# Patient Record
Sex: FEMALE | Race: WHITE | NOT HISPANIC OR LATINO | Employment: UNEMPLOYED | URBAN - METROPOLITAN AREA
[De-identification: names, ages, dates, MRNs, and addresses within clinical notes are randomized per-mention and may not be internally consistent; named-entity substitution may affect disease eponyms.]

---
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
## 2020-11-27 ENCOUNTER — HOSPITAL ENCOUNTER (EMERGENCY)
Facility: CLINIC | Age: 20
Discharge: HOME | End: 2020-11-27
Attending: EMERGENCY MEDICINE | Admitting: EMERGENCY MEDICINE
Payer: COMMERCIAL

## 2020-11-27 VITALS
HEART RATE: 89 BPM | SYSTOLIC BLOOD PRESSURE: 124 MMHG | RESPIRATION RATE: 16 BRPM | TEMPERATURE: 99.2 F | DIASTOLIC BLOOD PRESSURE: 71 MMHG | WEIGHT: 230 LBS | HEIGHT: 66 IN | BODY MASS INDEX: 36.96 KG/M2 | OXYGEN SATURATION: 98 %

## 2020-11-27 DIAGNOSIS — L73.2 HIDRADENITIS SUPPURATIVA OF LEFT AXILLA: ICD-10-CM

## 2020-11-27 PROCEDURE — 99282 EMERGENCY DEPT VISIT SF MDM: CPT

## 2020-11-27 RX ORDER — KETOROLAC TROMETHAMINE 10 MG/1
10 TABLET, FILM COATED ORAL EVERY 12 HOURS PRN
Qty: 8 TABLET | Refills: 0 | Status: SHIPPED | OUTPATIENT
Start: 2020-11-27 | End: 2020-12-01

## 2020-11-27 RX ORDER — CEPHALEXIN 500 MG/1
500 CAPSULE ORAL 2 TIMES DAILY
Qty: 10 CAPSULE | Refills: 0 | Status: SHIPPED | OUTPATIENT
Start: 2020-11-27 | End: 2020-12-02

## 2020-11-27 RX ORDER — SULFAMETHOXAZOLE/TRIMETHOPRIM 800-160 MG
1 TABLET ORAL 2 TIMES DAILY
Qty: 20 TABLET | Refills: 0 | Status: SHIPPED | OUTPATIENT
Start: 2020-11-27 | End: 2020-12-07

## 2020-11-27 ASSESSMENT — ENCOUNTER SYMPTOMS
FEVER: 0
WOUND: 1

## 2020-11-27 ASSESSMENT — MIFFLIN-ST. JEOR: SCORE: 1830.02

## 2020-11-27 NOTE — ED AVS SNAPSHOT
[facility] Emergency Dept  [address]  [facility] [address]  Phone: [phone]  Fax: [phone]                                    Yoly Tipton   MRN: [id number]    Department: [facility] Emergency Dept   Date of Visit: 11/27/2020           After Visit Summary Signature Page    I have received my discharge instructions, and my questions have been answered. I have discussed any challenges I see with this plan with the nurse or doctor.    ..........................................................................................................................................  Patient/Patient Representative Signature      ..........................................................................................................................................  Patient Representative Print Name and Relationship to Patient    ..................................................               ................................................  Date                                   Time    ..........................................................................................................................................  Reviewed by Signature/Title    ...................................................              ..............................................  Date                                               Time          22EPIC Rev 08/18

## 2020-11-27 NOTE — ED PROVIDER NOTES
"  History     Chief Complaint:  Wound Check    HPI Room 19  Yoly Tipton is a 20 year old female who presents with for a wound check. The patient states for the past few months she has been having wounds all over her body. Currently she has a wound in her left armpit that is very painful and it is painful to move her arm. She states typically once the wounds rupture her pain decreases. She is not on any antibiotics. There is no fever.     Allergies:  No Known Drug Allergies      Medications:    Celexa  Albuterol    Past Medical History:    Asthma    Past Surgical History:    [place] teeth extraction    Family History:    Arthritis  Asthma    Social History:  Smoking status: Yes  Alcohol use: No  Patient presents alone.  PCP: [facility]    Marital Status:  Single      Review of Systems   Constitutional: Negative for fever.   Skin: Positive for wound (left arm pit).   All other systems reviewed and are negative.      Physical Exam     Patient Vitals for the past 24 hrs:   BP Temp Temp src Pulse Resp SpO2 Height Weight   11/27/20 1448 (!) 142/83 99.2  F (37.3  C) Temporal 110 14 99 % 1.676 m (5' 6\") 104.3 kg (230 lb)      Physical Exam  Constitutional: Alert  HENT:    Nose: Nose normal.    Mouth/Throat: Oropharynx is clear, mucous membranes are moist  Eyes: EOM are normal. Pupils are equal, round, and reactive to light.   CV: Regular rate and rhythm, no murmurs, rubs or gallops.  Resp: Clear lungs to auscultation, all lung fields. Normal respiratory effort.   GI: Soft, non-distended. There is no tenderness. No rebound or guarding.   MSK: Normal range of motion. No deformity.   Neurological:   A/Ox3  5/5 strength is symmetric to the upper and lower extremities;   Sensation intact to light touch throughout the upper and lower extremities;   Skin: Skin is warm and dry. Erythema and induration to left axilla consistent with hidradenitis suppurativa.  Emergency Department Course     Emergency Department " Course:  1512 Nursing notes and vitals reviewed. I performed an exam of the patient as documented above.     Findings and plan explained to the Patient. Patient discharged home with instructions regarding supportive care, medications, and reasons to return. The importance of close follow-up was reviewed. The patient was prescribed Keflex and Bactrim.    Impression & Plan      Medical Decision Makin year old female who comes in with swelling, redness, pain to the left axilla. Exam as above. Findings consistent with hidradenitis suppurativa. Keflex, bactrim, ketorolac prescribed for the patient. She was given referral to general surgery. Patient also instructed to get MRSA colonization testing through her primary care doctor as she also reports widespread skin infections but only to the left axilla at this time. Patient expressed understanding and was in agreement with the plan and discharge instructions which included reasons to return and follow-up.      Diagnosis:    ICD-10-CM    1. Hidradenitis suppurativa of left axilla  L73.2        Disposition:  Discharged to home    Discharge Medications:  New Prescriptions    CEPHALEXIN (KEFLEX) 500 MG CAPSULE    Take 1 capsule (500 mg) by mouth 2 times daily for 5 days    KETOROLAC (TORADOL) 10 MG TABLET    Take 1 tablet (10 mg) by mouth every 12 hours as needed for moderate pain    SULFAMETHOXAZOLE-TRIMETHOPRIM (BACTRIM DS) 800-160 MG TABLET    Take 1 tablet by mouth 2 times daily for 10 days       Katelyn Fu  2020   [facility] EMERGENCY DEPT    Scribe Disclosure:  I, Katelyn Fu, am serving as a scribe at 3:12 PM on 2020 to document services personally performed by Guero Cabrera DO based on my observations and the provider's statements to me.         Guero Cabrera DO  20 0317
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
## 2020-12-21 ENCOUNTER — OFFICE VISIT (OUTPATIENT)
Dept: SURGERY | Facility: CLINIC | Age: 20
End: 2020-12-21
Payer: COMMERCIAL

## 2020-12-21 VITALS
RESPIRATION RATE: 16 BRPM | DIASTOLIC BLOOD PRESSURE: 70 MMHG | OXYGEN SATURATION: 100 % | HEART RATE: 74 BPM | HEIGHT: 66 IN | BODY MASS INDEX: 36.96 KG/M2 | SYSTOLIC BLOOD PRESSURE: 130 MMHG | WEIGHT: 230 LBS

## 2020-12-21 DIAGNOSIS — L73.2 HIDRADENITIS SUPPURATIVA OF LEFT AXILLA: Primary | ICD-10-CM

## 2020-12-21 PROCEDURE — 99203 OFFICE O/P NEW LOW 30 MIN: CPT | Performed by: SURGERY

## 2020-12-21 ASSESSMENT — MIFFLIN-ST. JEOR: SCORE: 1830.02

## 2020-12-21 NOTE — PROGRESS NOTES
REVIEW OF SYSTEMS:    Constitutional: Negative    Cardiovascular: Negative    Respiratory: Negative    Endocrine:  Palpitations    Hematologic: Negative    Gastrointestinal: Negative    Genitourinary: Negative    Musculoskeletal: Negative      Integumentary / Breast : Negative    Neurologic: Negative

## 2020-12-21 NOTE — PROGRESS NOTES
HPI:  Yoly presents today for a dermal mass on her left axilla. She has had  intermittent drainage from the site in the past.   It is persistently painful.  It's size is Fluctuating.  She currently has 2 lesions in her left axilla and one on her left central breast    PE:  There were no vitals taken for this visit.  General appearance: well-nourished, no apparent distress  Lungs: respirations unlabored  Neurologic: nonfocal, grossly intact times four extremities, alert and oriented times three  Psychiatric: Mood and affect are appropriate  Skin: There is a 2 cm Mass of protuberant granulation tissue on the upper left axilla.  There is a smaller area in the lower left axilla and on the breast there is a 1.5 cm nonprotuberant area that appears to be lacking its epidermis but no deep abscess or protuberant granulation tissue    Impression: Probable hidradenitis         Plan:  Silver nitrate was applied to the protuberant granulation tissues.  None was applied to the lesion on the breast as this did not seem to have significant granulation at this time.  With these 3 areas of involvement, I recommended that she be seen by a dermatologist who specializes in this issue such as Dr. Thomas at [facility].  I gave her contact information for him.  We would be happy to continue treating her for the protuberant granulation areas if she prefers.    Guero Ivory MD    Please route or send letter to:  Primary Care Provider (PCP) and Include Progress Note

## 2021-03-14 ENCOUNTER — APPOINTMENT (OUTPATIENT)
Dept: CT IMAGING | Facility: CLINIC | Age: 21
DRG: 757 | End: 2021-03-14
Attending: EMERGENCY MEDICINE
Payer: COMMERCIAL

## 2021-03-14 ENCOUNTER — APPOINTMENT (OUTPATIENT)
Dept: ULTRASOUND IMAGING | Facility: CLINIC | Age: 21
DRG: 757 | End: 2021-03-14
Attending: EMERGENCY MEDICINE
Payer: COMMERCIAL

## 2021-03-14 ENCOUNTER — HOSPITAL ENCOUNTER (INPATIENT)
Facility: CLINIC | Age: 21
LOS: 6 days | Discharge: HOME | DRG: 757 | End: 2021-03-20
Attending: EMERGENCY MEDICINE | Admitting: OBSTETRICS & GYNECOLOGY
Payer: COMMERCIAL

## 2021-03-14 DIAGNOSIS — N17.9 ACUTE KIDNEY INJURY (H): ICD-10-CM

## 2021-03-14 DIAGNOSIS — D72.829 LEUKOCYTOSIS, UNSPECIFIED TYPE: ICD-10-CM

## 2021-03-14 DIAGNOSIS — N73.9 PELVIC ABSCESS IN FEMALE: ICD-10-CM

## 2021-03-14 LAB
ALBUMIN SERPL-MCNC: 3.2 G/DL (ref 3.4–5)
ALBUMIN UR-MCNC: 300 MG/DL
ALP SERPL-CCNC: 80 U/L (ref 40–150)
ALT SERPL W P-5'-P-CCNC: 19 U/L (ref 0–50)
ANION GAP SERPL CALCULATED.3IONS-SCNC: 12 MMOL/L (ref 3–14)
ANISOCYTOSIS BLD QL SMEAR: SLIGHT
APPEARANCE UR: ABNORMAL
AST SERPL W P-5'-P-CCNC: 16 U/L (ref 0–45)
BACTERIA #/AREA URNS HPF: ABNORMAL /HPF
BASOPHILS # BLD AUTO: 0 10E9/L (ref 0–0.2)
BASOPHILS NFR BLD AUTO: 0.2 %
BILIRUB SERPL-MCNC: 0.5 MG/DL (ref 0.2–1.3)
BILIRUB UR QL STRIP: NEGATIVE
BUN SERPL-MCNC: 20 MG/DL (ref 7–30)
CALCIUM SERPL-MCNC: 9.2 MG/DL (ref 8.5–10.1)
CHLORIDE SERPL-SCNC: 100 MMOL/L (ref 94–109)
CO2 SERPL-SCNC: 20 MMOL/L (ref 20–32)
COLOR UR AUTO: YELLOW
CREAT SERPL-MCNC: 1.17 MG/DL (ref 0.52–1.04)
CREAT SERPL-MCNC: 1.7 MG/DL (ref 0.52–1.04)
CREAT UR-MCNC: 146 MG/DL
CRP SERPL-MCNC: 335 MG/L (ref 0–8)
DIFFERENTIAL METHOD BLD: ABNORMAL
EOSINOPHIL # BLD AUTO: 0.1 10E9/L (ref 0–0.7)
EOSINOPHIL NFR BLD AUTO: 0.3 %
ERYTHROCYTE [DISTWIDTH] IN BLOOD BY AUTOMATED COUNT: 15.9 % (ref 10–15)
ERYTHROCYTE [SEDIMENTATION RATE] IN BLOOD BY WESTERGREN METHOD: 52 MM/H (ref 0–20)
FLUAV RNA RESP QL NAA+PROBE: NEGATIVE
FLUBV RNA RESP QL NAA+PROBE: NEGATIVE
FRACT EXCRET NA UR+SERPL-RTO: <0 %
GFR SERPL CREATININE-BSD FRML MDRD: 42 ML/MIN/{1.73_M2}
GLUCOSE SERPL-MCNC: 97 MG/DL (ref 70–99)
GLUCOSE UR STRIP-MCNC: NEGATIVE MG/DL
GRAN CASTS #/AREA URNS LPF: 20 /LPF
HCG SERPL QL: NEGATIVE
HCT VFR BLD AUTO: 39 % (ref 35–47)
HGB BLD-MCNC: 11.9 G/DL (ref 11.7–15.7)
HGB UR QL STRIP: ABNORMAL
HYALINE CASTS #/AREA URNS LPF: 4 /LPF (ref 0–2)
IMM GRANULOCYTES # BLD: 0.3 10E9/L (ref 0–0.4)
IMM GRANULOCYTES NFR BLD: 1.5 %
KETONES UR STRIP-MCNC: NEGATIVE MG/DL
LABORATORY COMMENT REPORT: NORMAL
LACTATE BLD-SCNC: 1.2 MMOL/L (ref 0.7–2)
LEUKOCYTE ESTERASE UR QL STRIP: NEGATIVE
LIPASE SERPL-CCNC: 53 U/L (ref 73–393)
LYMPHOCYTES # BLD AUTO: 0.9 10E9/L (ref 0.8–5.3)
LYMPHOCYTES NFR BLD AUTO: 4.2 %
MCH RBC QN AUTO: 22.1 PG (ref 26.5–33)
MCHC RBC AUTO-ENTMCNC: 30.5 G/DL (ref 31.5–36.5)
MCV RBC AUTO: 73 FL (ref 78–100)
MONOCYTES # BLD AUTO: 1.1 10E9/L (ref 0–1.3)
MONOCYTES NFR BLD AUTO: 5.3 %
MUCOUS THREADS #/AREA URNS LPF: PRESENT /LPF
NEUTROPHILS # BLD AUTO: 18.3 10E9/L (ref 1.6–8.3)
NEUTROPHILS NFR BLD AUTO: 88.5 %
NITRATE UR QL: NEGATIVE
NRBC # BLD AUTO: 0 10*3/UL
NRBC BLD AUTO-RTO: 0 /100
OSMOLALITY SERPL: 283 MMOL/KG (ref 275–295)
OSMOLALITY UR: 546 MMOL/KG (ref 100–1200)
PH UR STRIP: 6 PH (ref 5–7)
PLATELET # BLD AUTO: 272 10E9/L (ref 150–450)
PLATELET # BLD EST: ABNORMAL 10*3/UL
POTASSIUM SERPL-SCNC: 3 MMOL/L (ref 3.4–5.3)
PROCALCITONIN SERPL-MCNC: 31.96 NG/ML
PROT SERPL-MCNC: 8.8 G/DL (ref 6.8–8.8)
RBC # BLD AUTO: 5.38 10E12/L (ref 3.8–5.2)
RBC #/AREA URNS AUTO: 3 /HPF (ref 0–2)
RSV RNA SPEC QL NAA+PROBE: NORMAL
SARS-COV-2 RNA RESP QL NAA+PROBE: NEGATIVE
SODIUM SERPL-SCNC: 132 MMOL/L (ref 133–144)
SODIUM SERPL-SCNC: 136 MMOL/L (ref 133–144)
SODIUM UR-SCNC: <5 MMOL/L
SOURCE: ABNORMAL
SP GR UR STRIP: 1.03 (ref 1–1.03)
SPECIMEN SOURCE: NORMAL
SPECIMEN SOURCE: NORMAL
SQUAMOUS #/AREA URNS AUTO: 10 /HPF (ref 0–1)
UROBILINOGEN UR STRIP-MCNC: NORMAL MG/DL (ref 0–2)
WBC # BLD AUTO: 20.7 10E9/L (ref 4–11)
WBC #/AREA URNS AUTO: 21 /HPF (ref 0–5)
WET PREP SPEC: NORMAL

## 2021-03-14 PROCEDURE — 85025 COMPLETE CBC W/AUTO DIFF WBC: CPT | Performed by: EMERGENCY MEDICINE

## 2021-03-14 PROCEDURE — 86140 C-REACTIVE PROTEIN: CPT | Performed by: EMERGENCY MEDICINE

## 2021-03-14 PROCEDURE — 258N000003 HC RX IP 258 OP 636: Performed by: HOSPITALIST

## 2021-03-14 PROCEDURE — 96365 THER/PROPH/DIAG IV INF INIT: CPT | Mod: 59

## 2021-03-14 PROCEDURE — 87086 URINE CULTURE/COLONY COUNT: CPT | Performed by: EMERGENCY MEDICINE

## 2021-03-14 PROCEDURE — 258N000003 HC RX IP 258 OP 636: Performed by: EMERGENCY MEDICINE

## 2021-03-14 PROCEDURE — 74177 CT ABD & PELVIS W/CONTRAST: CPT

## 2021-03-14 PROCEDURE — 87040 BLOOD CULTURE FOR BACTERIA: CPT | Performed by: EMERGENCY MEDICINE

## 2021-03-14 PROCEDURE — 80053 COMPREHEN METABOLIC PANEL: CPT | Performed by: EMERGENCY MEDICINE

## 2021-03-14 PROCEDURE — 83935 ASSAY OF URINE OSMOLALITY: CPT | Performed by: HOSPITALIST

## 2021-03-14 PROCEDURE — 258N000003 HC RX IP 258 OP 636: Performed by: OBSTETRICS & GYNECOLOGY

## 2021-03-14 PROCEDURE — 120N000006 HC R&B PEDS

## 2021-03-14 PROCEDURE — 86140 C-REACTIVE PROTEIN: CPT | Performed by: HOSPITALIST

## 2021-03-14 PROCEDURE — 99221 1ST HOSP IP/OBS SF/LOW 40: CPT | Performed by: HOSPITALIST

## 2021-03-14 PROCEDURE — 81001 URINALYSIS AUTO W/SCOPE: CPT | Performed by: EMERGENCY MEDICINE

## 2021-03-14 PROCEDURE — 250N000011 HC RX IP 250 OP 636: Performed by: OBSTETRICS & GYNECOLOGY

## 2021-03-14 PROCEDURE — 36415 COLL VENOUS BLD VENIPUNCTURE: CPT | Performed by: HOSPITALIST

## 2021-03-14 PROCEDURE — 85652 RBC SED RATE AUTOMATED: CPT | Performed by: EMERGENCY MEDICINE

## 2021-03-14 PROCEDURE — 84295 ASSAY OF SERUM SODIUM: CPT | Performed by: OBSTETRICS & GYNECOLOGY

## 2021-03-14 PROCEDURE — 250N000009 HC RX 250: Performed by: EMERGENCY MEDICINE

## 2021-03-14 PROCEDURE — 76830 TRANSVAGINAL US NON-OB: CPT

## 2021-03-14 PROCEDURE — 36415 COLL VENOUS BLD VENIPUNCTURE: CPT | Performed by: EMERGENCY MEDICINE

## 2021-03-14 PROCEDURE — 87591 N.GONORRHOEAE DNA AMP PROB: CPT | Performed by: EMERGENCY MEDICINE

## 2021-03-14 PROCEDURE — 250N000013 HC RX MED GY IP 250 OP 250 PS 637: Performed by: OBSTETRICS & GYNECOLOGY

## 2021-03-14 PROCEDURE — 93005 ELECTROCARDIOGRAM TRACING: CPT

## 2021-03-14 PROCEDURE — 83605 ASSAY OF LACTIC ACID: CPT | Performed by: EMERGENCY MEDICINE

## 2021-03-14 PROCEDURE — 83690 ASSAY OF LIPASE: CPT | Performed by: EMERGENCY MEDICINE

## 2021-03-14 PROCEDURE — 250N000011 HC RX IP 250 OP 636: Performed by: EMERGENCY MEDICINE

## 2021-03-14 PROCEDURE — 83930 ASSAY OF BLOOD OSMOLALITY: CPT | Performed by: HOSPITALIST

## 2021-03-14 PROCEDURE — 87210 SMEAR WET MOUNT SALINE/INK: CPT | Performed by: EMERGENCY MEDICINE

## 2021-03-14 PROCEDURE — 87491 CHLMYD TRACH DNA AMP PROBE: CPT | Performed by: EMERGENCY MEDICINE

## 2021-03-14 PROCEDURE — 96376 TX/PRO/DX INJ SAME DRUG ADON: CPT

## 2021-03-14 PROCEDURE — 96375 TX/PRO/DX INJ NEW DRUG ADDON: CPT

## 2021-03-14 PROCEDURE — C9803 HOPD COVID-19 SPEC COLLECT: HCPCS

## 2021-03-14 PROCEDURE — 84145 PROCALCITONIN (PCT): CPT | Performed by: HOSPITALIST

## 2021-03-14 PROCEDURE — 87636 SARSCOV2 & INF A&B AMP PRB: CPT | Performed by: EMERGENCY MEDICINE

## 2021-03-14 PROCEDURE — 96361 HYDRATE IV INFUSION ADD-ON: CPT

## 2021-03-14 PROCEDURE — 84300 ASSAY OF URINE SODIUM: CPT | Performed by: HOSPITALIST

## 2021-03-14 PROCEDURE — 84703 CHORIONIC GONADOTROPIN ASSAY: CPT | Performed by: EMERGENCY MEDICINE

## 2021-03-14 PROCEDURE — 99285 EMERGENCY DEPT VISIT HI MDM: CPT | Mod: 25

## 2021-03-14 PROCEDURE — 36415 COLL VENOUS BLD VENIPUNCTURE: CPT | Performed by: OBSTETRICS & GYNECOLOGY

## 2021-03-14 PROCEDURE — 82570 ASSAY OF URINE CREATININE: CPT | Performed by: HOSPITALIST

## 2021-03-14 RX ORDER — NALOXONE HYDROCHLORIDE 0.4 MG/ML
0.2 INJECTION, SOLUTION INTRAMUSCULAR; INTRAVENOUS; SUBCUTANEOUS
Status: DISCONTINUED | OUTPATIENT
Start: 2021-03-14 | End: 2021-03-20 | Stop reason: HOSPADM

## 2021-03-14 RX ORDER — ACETAMINOPHEN 325 MG/1
650 TABLET ORAL EVERY 4 HOURS PRN
Status: DISCONTINUED | OUTPATIENT
Start: 2021-03-14 | End: 2021-03-20 | Stop reason: HOSPADM

## 2021-03-14 RX ORDER — CEFOTETAN DISODIUM 2 G/20ML
2 INJECTION, POWDER, FOR SOLUTION INTRAMUSCULAR; INTRAVENOUS ONCE
Status: COMPLETED | OUTPATIENT
Start: 2021-03-14 | End: 2021-03-14

## 2021-03-14 RX ORDER — ACETAMINOPHEN 325 MG/1
TABLET ORAL
Status: DISPENSED
Start: 2021-03-14 | End: 2021-03-14

## 2021-03-14 RX ORDER — DOXYCYCLINE 100 MG/10ML
100 INJECTION, POWDER, LYOPHILIZED, FOR SOLUTION INTRAVENOUS ONCE
Status: COMPLETED | OUTPATIENT
Start: 2021-03-14 | End: 2021-03-14

## 2021-03-14 RX ORDER — LEVOFLOXACIN 5 MG/ML
500 INJECTION, SOLUTION INTRAVENOUS EVERY 24 HOURS
Status: DISCONTINUED | OUTPATIENT
Start: 2021-03-14 | End: 2021-03-15

## 2021-03-14 RX ORDER — SODIUM CHLORIDE 9 MG/ML
INJECTION, SOLUTION INTRAVENOUS CONTINUOUS
Status: DISCONTINUED | OUTPATIENT
Start: 2021-03-14 | End: 2021-03-16

## 2021-03-14 RX ORDER — IOPAMIDOL 755 MG/ML
100 INJECTION, SOLUTION INTRAVASCULAR ONCE
Status: COMPLETED | OUTPATIENT
Start: 2021-03-14 | End: 2021-03-14

## 2021-03-14 RX ORDER — ONDANSETRON 2 MG/ML
4 INJECTION INTRAMUSCULAR; INTRAVENOUS
Status: COMPLETED | OUTPATIENT
Start: 2021-03-14 | End: 2021-03-14

## 2021-03-14 RX ORDER — NALOXONE HYDROCHLORIDE 0.4 MG/ML
0.4 INJECTION, SOLUTION INTRAMUSCULAR; INTRAVENOUS; SUBCUTANEOUS
Status: DISCONTINUED | OUTPATIENT
Start: 2021-03-14 | End: 2021-03-20 | Stop reason: HOSPADM

## 2021-03-14 RX ORDER — HYDROMORPHONE HCL IN WATER/PF 6 MG/30 ML
0.2 PATIENT CONTROLLED ANALGESIA SYRINGE INTRAVENOUS
Status: DISCONTINUED | OUTPATIENT
Start: 2021-03-14 | End: 2021-03-15

## 2021-03-14 RX ORDER — ALBUTEROL SULFATE 90 UG/1
2 AEROSOL, METERED RESPIRATORY (INHALATION) EVERY 6 HOURS
COMMUNITY
End: 2021-03-14

## 2021-03-14 RX ORDER — IBUPROFEN 600 MG/1
600 TABLET, FILM COATED ORAL EVERY 6 HOURS PRN
Status: DISCONTINUED | OUTPATIENT
Start: 2021-03-14 | End: 2021-03-14

## 2021-03-14 RX ORDER — ONDANSETRON 2 MG/ML
4 INJECTION INTRAMUSCULAR; INTRAVENOUS EVERY 6 HOURS PRN
Status: DISCONTINUED | OUTPATIENT
Start: 2021-03-14 | End: 2021-03-15

## 2021-03-14 RX ORDER — POLYETHYLENE GLYCOL 3350 17 G/17G
17 POWDER, FOR SOLUTION ORAL DAILY
Status: DISCONTINUED | OUTPATIENT
Start: 2021-03-14 | End: 2021-03-17

## 2021-03-14 RX ORDER — MORPHINE SULFATE 4 MG/ML
4 INJECTION, SOLUTION INTRAMUSCULAR; INTRAVENOUS
Status: COMPLETED | OUTPATIENT
Start: 2021-03-14 | End: 2021-03-14

## 2021-03-14 RX ORDER — OXYCODONE HYDROCHLORIDE 5 MG/1
5-10 TABLET ORAL
Status: DISCONTINUED | OUTPATIENT
Start: 2021-03-14 | End: 2021-03-20 | Stop reason: HOSPADM

## 2021-03-14 RX ORDER — LIDOCAINE 40 MG/G
CREAM TOPICAL
Status: DISCONTINUED | OUTPATIENT
Start: 2021-03-14 | End: 2021-03-20 | Stop reason: HOSPADM

## 2021-03-14 RX ORDER — ONDANSETRON 4 MG/1
4 TABLET, ORALLY DISINTEGRATING ORAL EVERY 6 HOURS PRN
Status: DISCONTINUED | OUTPATIENT
Start: 2021-03-14 | End: 2021-03-15

## 2021-03-14 RX ORDER — KETOROLAC TROMETHAMINE 15 MG/ML
15 INJECTION, SOLUTION INTRAMUSCULAR; INTRAVENOUS ONCE
Status: COMPLETED | OUTPATIENT
Start: 2021-03-14 | End: 2021-03-14

## 2021-03-14 RX ORDER — KETOROLAC TROMETHAMINE 30 MG/ML
30 INJECTION, SOLUTION INTRAMUSCULAR; INTRAVENOUS EVERY 6 HOURS PRN
Status: DISCONTINUED | OUTPATIENT
Start: 2021-03-14 | End: 2021-03-14

## 2021-03-14 RX ADMIN — MORPHINE SULFATE 4 MG: 4 INJECTION INTRAVENOUS at 06:01

## 2021-03-14 RX ADMIN — ONDANSETRON 4 MG: 4 TABLET, ORALLY DISINTEGRATING ORAL at 13:54

## 2021-03-14 RX ADMIN — KETOROLAC TROMETHAMINE 30 MG: 30 INJECTION, SOLUTION INTRAMUSCULAR at 12:38

## 2021-03-14 RX ADMIN — ACETAMINOPHEN 650 MG: 325 TABLET, FILM COATED ORAL at 10:54

## 2021-03-14 RX ADMIN — HYDROMORPHONE HYDROCHLORIDE 0.2 MG: 0.2 INJECTION, SOLUTION INTRAMUSCULAR; INTRAVENOUS; SUBCUTANEOUS at 17:59

## 2021-03-14 RX ADMIN — KETOROLAC TROMETHAMINE 15 MG: 15 INJECTION, SOLUTION INTRAMUSCULAR; INTRAVENOUS at 06:01

## 2021-03-14 RX ADMIN — SODIUM CHLORIDE 1000 ML: 9 INJECTION, SOLUTION INTRAVENOUS at 05:53

## 2021-03-14 RX ADMIN — CEFOTETAN DISODIUM 2 G: 2 INJECTION, POWDER, FOR SOLUTION INTRAMUSCULAR; INTRAVENOUS at 08:43

## 2021-03-14 RX ADMIN — SODIUM CHLORIDE: 9 INJECTION, SOLUTION INTRAVENOUS at 21:37

## 2021-03-14 RX ADMIN — OXYCODONE HYDROCHLORIDE 10 MG: 5 TABLET ORAL at 21:36

## 2021-03-14 RX ADMIN — OXYCODONE HYDROCHLORIDE 5 MG: 5 TABLET ORAL at 15:43

## 2021-03-14 RX ADMIN — SODIUM CHLORIDE: 9 INJECTION, SOLUTION INTRAVENOUS at 10:36

## 2021-03-14 RX ADMIN — DOXYCYCLINE 100 MG: 100 INJECTION, POWDER, LYOPHILIZED, FOR SOLUTION INTRAVENOUS at 09:29

## 2021-03-14 RX ADMIN — METRONIDAZOLE 500 MG: 500 INJECTION, SOLUTION INTRAVENOUS at 11:20

## 2021-03-14 RX ADMIN — SODIUM CHLORIDE 1000 ML: 9 INJECTION, SOLUTION INTRAVENOUS at 06:37

## 2021-03-14 RX ADMIN — LEVOFLOXACIN 500 MG: 500 INJECTION, SOLUTION INTRAVENOUS at 12:39

## 2021-03-14 RX ADMIN — METRONIDAZOLE 500 MG: 500 INJECTION, SOLUTION INTRAVENOUS at 22:44

## 2021-03-14 RX ADMIN — MORPHINE SULFATE 4 MG: 4 INJECTION INTRAVENOUS at 08:01

## 2021-03-14 RX ADMIN — IOPAMIDOL 100 ML: 755 INJECTION, SOLUTION INTRAVENOUS at 06:53

## 2021-03-14 RX ADMIN — ONDANSETRON 4 MG: 2 INJECTION INTRAMUSCULAR; INTRAVENOUS at 06:01

## 2021-03-14 RX ADMIN — MORPHINE SULFATE 4 MG: 4 INJECTION INTRAVENOUS at 10:16

## 2021-03-14 ASSESSMENT — ENCOUNTER SYMPTOMS
FEVER: 1
CHILLS: 1
NAUSEA: 1
SHORTNESS OF BREATH: 0
DIARRHEA: 1
VOMITING: 1
FREQUENCY: 1
ABDOMINAL PAIN: 1
DYSURIA: 0
SORE THROAT: 0
COUGH: 0

## 2021-03-14 NOTE — CONSULTS
[facility]    Hospitalist Consultation    Date of Admission:  3/14/2021  Date of Consult (When I saw the patient): 03/14/21    Provider: Kartik Yoo MD    Assessment & Plan   Yoly Tipton is a 20 year old female who was admitted on 3/14/2021. I was asked to see the patient for medical management of chronic comorbidities.  She presented with fever, rigors and pelvic pain.  CT of abdomen and pelvis is suspicious for abscess formation at the level of the cul-de-sac with fat stranding around the uterus and surrounding bowels.  She has leukocytosis with neutrophilia and elevated inflammatory markers.  She has an acute kidney injury.  Her past medical history significant for mild intermittent asthma, suppurative hidradenitis and depression.  She had had an elective pregnancy termination 10 weeks ago, gynecology is concerned with the possibility of infection related to the intervention in the setting of treatment with Humira.    1.  Pelvic abscess in the cul-de-sac, radiological findings of inflammatory process around the uterus and surrounding bowels.  Pain, fever and rigors as well as sympathetic bowel symptoms.  Patient has been admitted to the hospital for antibiotic treatment and possible abscess drained by IR.  I agree with Levofloxacin/metronidazole treatment.  Rest of treatment as planned by OB/GYN.  2.  Acute kidney injury.  Suspect in the setting of sepsis, rad contrast and NSAID.  Obtain FENa, osmolalities.  Hydrate with NS.  Close follow-up of renal function.  No evidence of hydronephrosis in the CT but concerning because the use of contrast. No NSAID's. Close follow-up.  3.  Splenomegaly, suspect secondary to infectious process.  4.  Mild intermittent asthma.  Patient has not needed to use inhaler in months.  Stable.  No intervention.  5.  History of suppurativa hidradenitis on Humira.  Recently started on it by dermatology.  Held for now.  6.  History of depression.  Not on  any medication, appears euthymic.  .    DVT Prophylaxis: Pneumatic Compression Devices    Code Status: Full Code    Disposition: Expected discharge TBD.    Thank you so much for the opportunity of this consultation. I or one of my colleagues will follow along with you. Please contact me if you have any question regarding the plan of care.      Primary Care Physician   [facility]    Chief Complaint   Pelvic pain, fever and rigors    History is obtained from the patient, electronic health record and Gyn physician    History of Present Illness   Yoly Tipton is a 20 year old female who has a past medical history of mild intermittent asthma, suppurativa hidradenitis and depression.  Presents with new onset of acute pelvic pain for the last 6 days, fever and rigors.  She also has had pain with defecation and diarrhea.  She had had elective termination of pregnancy 10 weeks ago.  She has been also started on Humira by dermatology given recurrence of hidradenitis in the infraumbilical location (armpits, breast and meibomian gland) she had her first dose at the end of February and the second at the beginning of March.  On admission she has been found with leukocytosis of 20.7 with neutrophilia.  CT of abdomen and pelvis suspicious for abscess in the cul-de-sac with fat stranding around the uterus and surrounding bowel.  ESR 52, , chemistry compatible with acute kidney injury, creatinine 1.7, glomerular filtration rate 42, mild hyponatremia and hypokalemia with normal lactic acid.  At the moment of my interview, she is not febrile, pelvic pain present but tolerable, no nausea or vomiting, no other significant symptoms.  She does not have toxemic appearance and overall vitals are stable.    Past Medical History    I have reviewed this patient's medical history.   Past Medical History:   Diagnosis Date     Asthma      Depression      Hidradenitis suppurativa        Past Surgical History   I  have reviewed this patient's surgical history and updated it with pertinent information if needed.  Past Surgical History:   Procedure Laterality Date     [place] teeth extraction         Prior to Admission Medications   Prior to Admission Medications   Prescriptions Last Dose Informant Patient Reported? Taking?   adalimumab (HUMIRA) 40 MG/0.8ML prefilled syringe kit 3/3/2021  Yes Yes   Sig: Inject 80 mg Subcutaneous every 14 days   diphenhydrAMINE-acetaminophen (TYLENOL PM)  MG tablet 3/14/2021 at 0200  Yes Yes   Sig: Take 3 tablets by mouth nightly as needed for sleep      Facility-Administered Medications: None     Allergies   No Known Allergies    Social History   I have personally reviewed the social history with the patient showing.  Social History     Tobacco Use     Smoking status: Never Smoker     Smokeless tobacco: Never Used   Substance Use Topics     Alcohol use: Not on file       Family History   I have reviewed this patient's family history and it is not contributory to the admission..     Review of Systems   Ten points ROS done and pertinent as per HPI, otherwise negative    Physical Exam   Temp: 97.9  F (36.6  C) Temp src: Oral BP: 132/67 Pulse: 88   Resp: 18 SpO2: 96 % O2 Device: None (Room air)    Vital Signs with Ranges  Temp:  [97.9  F (36.6  C)-98.7  F (37.1  C)] 97.9  F (36.6  C)  Pulse:  [] 88  Resp:  [18-20] 18  BP: (108-151)/(58-78) 132/67  SpO2:  [94 %-97 %] 96 %  240 lbs 0 oz    GEN:  Alert, oriented x 3, appears comfortable, NAD.  HEENT:  Normocephalic/atraumatic, no scleral icterus, no nasal discharge, mouth moist.  CV:  Regular rate and rhythm, no murmur or JVD.  S1 + S2 noted, no S3 or S4.  LUNGS:  Clear to auscultation bilaterally without rales/rhonchi/wheezing/retractions.  Symmetric chest rise on inhalation noted.  ABD:  Active bowel sounds, soft, non-tender/non-distended.  No rebound/guarding/rigidity.  EXT:  No edema or cyanosis.  No joint synovitis noted.  SKIN:  Dry  to touch, no exanthems noted in the visualized areas.     Data   -Data reviewed today: All pertinent laboratory and imaging results from this encounter were reviewed.       Recent Labs   Lab 03/14/21  0551   WBC 20.7*   HGB 11.9   MCV 73*      *   POTASSIUM 3.0*   CHLORIDE 100   CO2 20   BUN 20   CR 1.70*   ANIONGAP 12   JOSEPH 9.2   GLC 97   ALBUMIN 3.2*   PROTTOTAL 8.8   BILITOTAL 0.5   ALKPHOS 80   ALT 19   AST 16   LIPASE 53*       Recent Results (from the past 24 hour(s))   CT Abdomen Pelvis w Contrast    Narrative    CT ABDOMEN AND PELVIS WITH CONTRAST   3/14/2021 7:02 AM     HISTORY: Right lower quadrant abdominal pain, appendicitis suspected  (Age >= 14y). RLQ abdominal pain, vomiting/diarrhea. Fever. Evaluate  for appendicitis.    TECHNIQUE:  CT abdomen and pelvis with 100mL Isovue-370 IV. Radiation  dose for this scan was reduced using automated exposure control,  adjustment of the mA and/or kV according to patient size, or iterative  reconstruction technique.    COMPARISON: None available    FINDINGS:  Lower chest: 1.7 x 1.6 cm partially calcified pulmonary granuloma in  the right lower lobe.    Abdomen/pelvis: No suspicious focal hepatic lesion. The gallbladder is  distended, otherwise unremarkable. No main pancreatic ductal  dilatation or definite solid pancreatic mass. The spleen is enlarged  measuring 14.4 cm in craniocaudal dimension. No adrenal nodules. No  radiodense kidney stones or hydronephrosis in either kidney.    Few prominent small bowel loops in the pelvis, could be reactive  related to the adjacent inflammation. The appendix is visualized and  appears normal. Small mildly loculated fluid measuring approximately  4.8 x 2.7 cm in axial dimensions in the cul-de-sac is noted (series 3  image 210). No free peritoneal or portal venous gas. Significant fat  stranding in the pelvis centered around the uterus and adnexa of  indeterminate etiology. Multiple prominent mesenteric and pelvic  lymph  nodes, indeterminate, could be reactive. The urinary bladder is  decompressed.    Bones and soft tissues: No suspicious osseous lesion.      Impression    IMPRESSION:  1. Significant nonfocal fat stranding in the pelvis centered around  the uterus and adnexa of indeterminate etiology. In addition, there is  mildly loculated fluid in the cul-de-sac, could represent developing  abscess. Findings could be related to PID, recommend further  evaluation with pelvic ultrasound.  2. The appendix is visualized and appears normal.  3. Splenomegaly.  4. 1.7 cm partially calcified right lower lobe pulmonary granuloma.    RUBI ALCARAZ MD   US Pelvis Cmplt w Transvag & Doppler LmtPel Duplex Limited    Narrative    PELVIC ULTRASOUND WITH ENDOVAGINAL TRANSDUCER IMAGING 3/14/2021 8:27  AM     HISTORY: Possible abscess on CT abdomen. Concern for PID.    TECHNIQUE:  Endovaginal sonography was added to the transabdominal  exam.    COMPARISON: CT abdomen/pelvis on 3/14/2020    FINDINGS:   Endometrium: Endometrium is 8 mm thick.    Uterus: Measures 11.4 x 3.4 x 5.4 cm. No uterine fibroids.    Right ovary: Measures 4.7 x 2.3 x 2.9 cm. It demonstrates normal  follicular structure and color-flow.    Left ovary: Measures 3.6 x 2.8 x 3.4 cm. It demonstrates normal  follicular structure and color-flow. There is 2.1 x 2.0 x 2.0 cm  anechoic cyst, likely represent physiological follicle.    Additional findings: There is approximately 5.3 x 1.4 x 4.8 cm mildly  loculated hypoechoic collection in the cul-de-sac, likely corresponds  to the collection seen on same-day CT.      Impression    IMPRESSION:    1. There is approximately 5.3 cm mildly loculated hypoechoic  collection in the cul-de-sac, likely corresponds to the collection  seen on same-day CT, indeterminate, could represent developing  abscess.  2. Ovaries are unremarkable. No adnexal masses.    RUBI ALCARAZ MD         Disclaimer: This note consists of symbols derived  from keyboarding, dictation and/or voice recognition software. As a result, there may be errors in the script that have gone undetected. Please consider this when interpreting information found in this chart.

## 2021-03-14 NOTE — ED TRIAGE NOTES
Pt arrives with complaints of RLQ abdominal pain that goes into R flank and lower back as well. Accompanied with diarrhea, nausea, vomiting, and fever as high as 101F at home. ABCs intact, A/O x4.

## 2021-03-14 NOTE — ED NOTES
I saw Ms Tipton briefly to initiate ED work up for the oncoming 6am physician.  Abdominal pain x4-5 days.  More localizing in the RLQ now than when it began.  +Nausea and vomiting.  Several loose stools.  Fever with Tmax 102 over past 2 days.  Increased urinary frequency and urgency.  No rash.  Coughing with emesis, else no respiratory symptoms.  No neck pain.  Mild headache.  Patient did have an  in December.    Past Medical History:   Diagnosis Date     Asthma      Depression      Hidradenitis suppurativa      Patient Vitals for the past 24 hrs:   BP Temp Temp src Pulse Resp SpO2 Weight   21 0535 151/76 98.7  F (37.1  C) Oral 133 20 97 % 108.9 kg (240 lb)     Nursing note and vitals reviewed.  Constitutional: Cooperative. Tired and uncomfortable appearing.   HENT:   Mouth/Throat: Mucous membranes are dry.   No neck rigidity.   Cardiovascular: Tachycardic  Pulmonary/Chest: Effort normal.  Abdominal: Exam deferred to 6am MD.    Neurological: Alert. Oriented x4.    Skin: Skin is warm and dry.   Psychiatric: Tearful and anxious.     Initial evaluation concerning for sepsis syndrome with reported fever and tachycardia.  Labs including Lactic acid and blood cultures, UA, HCG, and COVID ordered.  Will start IVF bolus as well as pain/nausea medications.  Patient comfortable with this plan.            Mikael Frederick MD  21 0584

## 2021-03-14 NOTE — PLAN OF CARE
VSS Using toradol, tyl and heat pad with minimal pain relief of RLQ and RUQ pain. Abd pain increases with bending to get in and out of bed, on/off toilet and voiding.  Feeling hot and diaphoretic at times but afebrile.  Cont with plan of care.

## 2021-03-14 NOTE — PHARMACY-ADMISSION MEDICATION HISTORY
Admission medication history interview status for this patient is complete. See [facility] admission navigator for allergy information, prior to admission medications and immunization status.     Medication history interview done, indicate source(s): Patient  Medication history resources (including written lists, pill bottles, clinic record): care everywhere  Pharmacy: [facility] in [place]    Changes made to PTA medication list:  Added: tylenol pm, humira  Deleted: albuterol, citalopram  Changed: nothing    Actions taken by pharmacist (provider contacted, etc):None     Additional medication history information: Patient reported that she hasn't had an albuterol inhaler in years, but that she probably needs one. The patient said it had been years since she had used an albuterol nebulizer as well. The patient recently started on Humira, and her dermatologist wants her target dose to be 160 mg/month. Her last dose was on 3/3 where she took 80 mg, and reported that her doctor wanted to switch her to 40 mg once weekly, but she hasn't started doing that yet. The patient stated the tylenol PM was for her insomnia.    Medication reconciliation/reorder completed by provider prior to medication history?  Y   (Y/N)     Prior to Admission medications    Medication Sig Last Dose Taking? Auth Provider   adalimumab (HUMIRA) 40 MG/0.8ML prefilled syringe kit Inject 80 mg Subcutaneous every 14 days 3/3/2021 Yes Unknown, Entered By History   diphenhydrAMINE-acetaminophen (TYLENOL PM)  MG tablet Take 3 tablets by mouth nightly as needed for sleep 3/14/2021 at 0200 Yes Unknown, Entered By History

## 2021-03-14 NOTE — PLAN OF CARE
VSS, afebrile. A/O. SBA. LS-clear.  C/o abdominal and lower back pain. RUQ and RLQ tender.  Oxy 5mg x1 with some relief and IV dilaudid 0.2mg x1 with some relief.  IVF increased to 125ml/h due to DENNY.Cr. 1.70 today, recheck tomorrow.   Possible I&D tomorrow. Ob/GYn following. IV flagyl and IV levaquin scheduled.

## 2021-03-14 NOTE — ED PROVIDER NOTES
History   Chief Complaint:  Abdominal Pain    The history is provided by the patient.      Yoly Tipton is a 20 year old female s/p dilation and curettage in 2020 who presents with right sided abdominal pain. Yoly notes her symptoms started several days ago with intermittent fevers and chills. This progressed to a diffuse abdominal pain that she describes as a gas pain. The pain has since intensified and is now localized to the right side. She has associated nausea, vomiting, and diarrhea. She denies dysuria but endorses a sensation of needing to urinate but being unable to do so. No cough, sore throat or shortness of breath.     Review of Systems   Constitutional: Positive for chills and fever.   HENT: Negative for sore throat.    Respiratory: Negative for cough and shortness of breath.    Gastrointestinal: Positive for abdominal pain, diarrhea, nausea and vomiting.   Genitourinary: Positive for frequency. Negative for dysuria.   All other systems reviewed and are negative.    Allergies:  No Known Allergies    Medications:  Celexa    Past Medical History:    Asthma  Depression  Hidradenitis suppurativa  Obesity       Past Surgical History:    [place] teeth extraction   Elective     Social History:  The patient presents to the ER unaccompanied.   History of tobacco use.     Physical Exam     Patient Vitals for the past 24 hrs:   BP Temp Temp src Pulse Resp SpO2 Weight   21 0730 123/77 -- -- 93 -- 96 % --   21 0645 118/61 98.2  F (36.8  C) Temporal 100 -- 97 % --   21 0630 131/58 -- -- 94 -- 94 % --   21 0615 -- -- -- -- -- 96 % --   21 0600 108/63 -- -- 100 -- 95 % --   21 0545 (!) 142/78 -- -- 123 -- -- --   21 0535 (!) 151/76 98.7  F (37.1  C) Oral 133 20 97 % 108.9 kg (240 lb)       Physical Exam    General: Sitting up in bed  Eyes:  The pupils are equal and round    Conjunctivae and sclerae are normal  ENT:    Wearing a mask  Neck:  Normal  range of motion  CV:  Tachycardic rate, regular rhythm     Skin warm and well perfused   Resp:  Non labored breathing on room air    No tachypnea    No cough heard  GI:  Abdomen is soft, there is no rigidity    No distension    No rebound tenderness     Tender on RLQ, minimal tenderness on RUQ  :  No CMT. White vaginal discharge. Female tech in room during exam  MS:  Normal muscular tone  Skin:  No rash or acute skin lesions noted  Neuro:   Awake, alert.      Speech is normal and fluent.    Face is symmetric.     Moves all extremities equally  Psych: Normal affect.  Appropriate interactions.    Emergency Department Course   ECG  ECG taken at 0614, ECG read at 0627  Sinus tachycardia  Otherwise normal ECG   Rate 104 bpm. LA interval 134 ms. QRS duration 92 ms. QT/QTc 320/420 ms. P-R-T axes  56 77  21.     Imaging:  US Pelvis Cmplt w Transvag & Doppler LmtPel Duplex Limited  IMPRESSION:    1. There is approximately 5.3 cm mildly loculated hypoechoic  collection in the cul-de-sac, likely corresponds to the collection  seen on same-day CT, indeterminate, could represent developing  abscess.  2. Ovaries are unremarkable. No adnexal masses.  Reading per radiology     CT Abdomen Pelvis w Contrast  IMPRESSION:  1. Significant nonfocal fat stranding in the pelvis centered around  the uterus and adnexa of indeterminate etiology. In addition, there is  mildly loculated fluid in the cul-de-sac, could represent developing  abscess. Findings could be related to PID, recommend further  evaluation with pelvic ultrasound.  2. The appendix is visualized and appears normal.  3. Splenomegaly.  4. 1.7 cm partially calcified right lower lobe pulmonary granuloma.  Reading per radiology     Laboratory:   Wet prep: Few PMNs seen. No Trichomonas seen. No clue cells seen. No yeast seen.  Chlamydia trachomatis PCR: pending  Neisseria gonorrhea PCR: pending     UA with microscopic: blood small, protein albumin 300(H), WBC 21(H), RBC 3(H),  bacteria many, nitrite negative, squamous epithelial 10, mucous present, hyaline casts 4, granular casts 20 o/w WNL    Urine culture pending    CBC: WBC 20.7(H), HGB 11.9,    CMP: (L), potassium 3.0(L), Creatinine 1.70(H), GFR estimate 42(L), albumin 3.2(L) o/w WNL   HCG qualitative blood: negative  Lactic acid (result time 0608) 1.2  Lipase: 53(L)   Blood cultures pending x2    Symptomatic Influenza A/B & SARS-CoV2 (COVID19) Virus PCR Multiplex: all negative      Emergency Department Course:    Reviewed:  I reviewed nursing notes, vitals, past medical history and care everywhere    Assessments:  0604 I obtained history and examined the patient as noted above.   0736 I rechecked the patient and explained findings. Discussed plan for Pelvic Ultrasound.   0742 I performed a chaperoned pelvic exam as noted above.   0846 I rechecked the patient and explained findings.    Consults:   2051 I spoke with Dr. Botello of the OB GYN service from [facility] regarding patient's presentation, findings, and plan of care.     Interventions:  0553 NS 1000 ml IV  0601 Morphine 4 mg IV  0601 Zofran 4 mg IV  0601 Toradol 15 mg IV  0637 NS 1000 mL IV  0843 Cefotetan 2 g IV  0929 Doxycycline 100 mg IV    Disposition:  The patient was admitted to the hospital under the care of Dr. Botello.       Impression & Plan     Medical Decision Making:  Yoly Tipton is a 20 year old female who presented to the ED with abdominal pain. Patient with multiple concerns but mainly concerned about her continued fever and abdominal pain. Tender on RLQ primarily. Labs with leukocytosis. CT abdomen showed concerns for pelvic abscess, possible PID. US pelvis obtained that showed likely abscess, ovaries appear normal. No CMT on exam. Given antibiotics for possible PID. Spoke to OB/GYN who will admit patient to their service, continue IV antibiotics and consider IR drainage. Possibly related to her elective D&C in December and now  immunosuppressed with Humira. No evidence of diverticulitis on exam to suggest this as cause of her abscess.     Covid-19  Yoly Tipton was evaluated during a global COVID-19 pandemic, which necessitated consideration that the patient might be at risk for infection with the SARS-CoV-2 virus that causes COVID-19.   Applicable protocols for evaluation were followed during the patient's care.   COVID-19 was considered as part of the patient's evaluation. The plan for testing is:  a test was obtained during this visit.    Diagnosis:    ICD-10-CM    1. Pelvic abscess in female  N73.9    2. Leukocytosis, unspecified type  D72.829    3. Acute kidney injury (H)  N17.9          Scribe Disclosure:  BRITTANIE, Geni Kennedy, am serving as a scribe at 6:02 AM on 3/14/2021 to document services personally performed by Jaquelin Kline MD based on my observations and the provider's statements to me.        Jaquelin Kline MD  03/14/21 1142

## 2021-03-14 NOTE — H&P
History and Physical     Yoly Tipton MRN# [id number]   YOB: 2000 Age: 20 year old      Date of Admission:  3/14/2021    Primary care provider: Clinic, [facility]          Assessment and Plan:   21YO  now 2.5 months s/p elective termination at 13 weeks gestation with pelvic abscess.  --Abscess in cul-de-sac and surrounding bowel inflammation, which is concerning for possible seeding from operative injury or bleeding. With new immune suppression (Humira) since January, I am concerned about ability to heal. She will discontinue now. Treat for abscess with metronidazole and levofloxacin given location, monitor progress. Consider IR drainage in AM.   --Monitor blood cultures and fluid culture after drainage. Consult ID as needed. Again, recommended patient stop Humira.   --Patient understands plan, will be admitted to gynecology service with consultations as needed.            Attestation:  This patient has been seen and evaluated by me, Vannesa Botello MD.              Chief Complaint:   Abdominal pain, fevers    History is obtained from the patient         History of Present Illness:   This patient is a 20 year old female who presents with abdominal pain and fevers, with worsening diarrhea for the past week. Had a TAB at 13 weeks gestation at end of December at [facility]. No known complications from surgery, but hasn't had a menstrual cycle since then. Was previously having regular, monthly cycles. Bleeding after surgery lasted 2-3 weeks.     Started Humira in January for hidradenitis suppuritiva. Began having worsening abdominal pain, diarrhea for the past week. Otherwise eating well, but nauseous for the past few days.     Did trial Ortho Evra patch for 2 weeks since surgery, but stopped when her patches wouldn't stay on.              Past Medical History:     Past Medical History:   Diagnosis Date     Asthma      Depression      Hidradenitis suppurativa               Past Surgical History:     Past Surgical History:   Procedure Laterality Date     [place] teeth extraction              Social History:   I have reviewed this patient's social history          Family History:   This patient has no significant family history          Immunizations:   Immunization status is unknown         Allergies:   No Known Allergies          Medications:     Medications Prior to Admission   Medication Sig Dispense Refill Last Dose     adalimumab (HUMIRA) 40 MG/0.8ML prefilled syringe kit Inject 80 mg Subcutaneous every 14 days   3/3/2021     diphenhydrAMINE-acetaminophen (TYLENOL PM)  MG tablet Take 3 tablets by mouth nightly as needed for sleep   3/14/2021 at 0200             Review of Systems:   The 10 point Review of Systems is negative other than noted in the HPI              Physical Exam:     Vitals were reviewed  Patient Vitals for the past 8 hrs:   BP Temp Temp src Pulse Resp SpO2 Weight   03/14/21 1016 132/67 97.9  F (36.6  C) Oral 88 18 -- --   03/14/21 0730 123/77 -- -- 93 -- 96 % --   03/14/21 0645 118/61 98.2  F (36.8  C) Temporal 100 -- 97 % --   03/14/21 0630 131/58 -- -- 94 -- 94 % --   03/14/21 0615 -- -- -- -- -- 96 % --   03/14/21 0600 108/63 -- -- 100 -- 95 % --   03/14/21 0545 (!) 142/78 -- -- 123 -- -- --   03/14/21 0535 (!) 151/76 98.7  F (37.1  C) Oral 133 20 97 % 108.9 kg (240 lb)     Gen--Lying in bed, NAD  CV--RRR  Abd--Obese, soft, nondistended. Moderate TTP in RLQ with pressure, non-radiating, no rebound.   Ext--Nontender, no unilateral edema       Data:   All laboratory data reviewed  Lab Results   Component Value Date    WBC 20.7 (H) 03/14/2021    HGB 11.9 03/14/2021    HCT 39.0 03/14/2021     03/14/2021     (L) 03/14/2021    POTASSIUM 3.0 (L) 03/14/2021    CHLORIDE 100 03/14/2021    CO2 20 03/14/2021    BUN 20 03/14/2021    CR 1.70 (H) 03/14/2021    GLC 97 03/14/2021    AST 16 03/14/2021    ALT 19 03/14/2021    ALKPHOS 80 03/14/2021     BILITOTAL 0.5 03/14/2021     Recent Results (from the past 24 hour(s))   CT Abdomen Pelvis w Contrast    Narrative    CT ABDOMEN AND PELVIS WITH CONTRAST   3/14/2021 7:02 AM     HISTORY: Right lower quadrant abdominal pain, appendicitis suspected  (Age >= 14y). RLQ abdominal pain, vomiting/diarrhea. Fever. Evaluate  for appendicitis.    TECHNIQUE:  CT abdomen and pelvis with 100mL Isovue-370 IV. Radiation  dose for this scan was reduced using automated exposure control,  adjustment of the mA and/or kV according to patient size, or iterative  reconstruction technique.    COMPARISON: None available    FINDINGS:  Lower chest: 1.7 x 1.6 cm partially calcified pulmonary granuloma in  the right lower lobe.    Abdomen/pelvis: No suspicious focal hepatic lesion. The gallbladder is  distended, otherwise unremarkable. No main pancreatic ductal  dilatation or definite solid pancreatic mass. The spleen is enlarged  measuring 14.4 cm in craniocaudal dimension. No adrenal nodules. No  radiodense kidney stones or hydronephrosis in either kidney.    Few prominent small bowel loops in the pelvis, could be reactive  related to the adjacent inflammation. The appendix is visualized and  appears normal. Small mildly loculated fluid measuring approximately  4.8 x 2.7 cm in axial dimensions in the cul-de-sac is noted (series 3  image 210). No free peritoneal or portal venous gas. Significant fat  stranding in the pelvis centered around the uterus and adnexa of  indeterminate etiology. Multiple prominent mesenteric and pelvic lymph  nodes, indeterminate, could be reactive. The urinary bladder is  decompressed.    Bones and soft tissues: No suspicious osseous lesion.      Impression    IMPRESSION:  1. Significant nonfocal fat stranding in the pelvis centered around  the uterus and adnexa of indeterminate etiology. In addition, there is  mildly loculated fluid in the cul-de-sac, could represent developing  abscess. Findings could be related  to PID, recommend further  evaluation with pelvic ultrasound.  2. The appendix is visualized and appears normal.  3. Splenomegaly.  4. 1.7 cm partially calcified right lower lobe pulmonary granuloma.    RUBI ALCARAZ MD   US Pelvis Cmplt w Transvag & Doppler LmtPel Duplex Limited    Narrative    PELVIC ULTRASOUND WITH ENDOVAGINAL TRANSDUCER IMAGING 3/14/2021 8:27  AM     HISTORY: Possible abscess on CT abdomen. Concern for PID.    TECHNIQUE:  Endovaginal sonography was added to the transabdominal  exam.    COMPARISON: CT abdomen/pelvis on 3/14/2020    FINDINGS:   Endometrium: Endometrium is 8 mm thick.    Uterus: Measures 11.4 x 3.4 x 5.4 cm. No uterine fibroids.    Right ovary: Measures 4.7 x 2.3 x 2.9 cm. It demonstrates normal  follicular structure and color-flow.    Left ovary: Measures 3.6 x 2.8 x 3.4 cm. It demonstrates normal  follicular structure and color-flow. There is 2.1 x 2.0 x 2.0 cm  anechoic cyst, likely represent physiological follicle.    Additional findings: There is approximately 5.3 x 1.4 x 4.8 cm mildly  loculated hypoechoic collection in the cul-de-sac, likely corresponds  to the collection seen on same-day CT.      Impression    IMPRESSION:    1. There is approximately 5.3 cm mildly loculated hypoechoic  collection in the cul-de-sac, likely corresponds to the collection  seen on same-day CT, indeterminate, could represent developing  abscess.  2. Ovaries are unremarkable. No adnexal masses.    MD Vannesa GALAVIZ MD

## 2021-03-14 NOTE — ED NOTES
[facility]  ED Nurse Handoff Report    Yoly Tipton is a 20 year old female   ED Chief complaint: Abdominal Pain  . ED Diagnosis:   Final diagnoses:   Pelvic abscess in female   Leukocytosis, unspecified type   Acute kidney injury (H)     Allergies: No Known Allergies    Code Status: Full Code  Activity level - Baseline/Home:  Independent. Activity Level - Current:   Stand by Assist. Lift room needed: No. Bariatric: No   Needed: No   Isolation: No. Infection: Not Applicable.     Vital Signs:   Vitals:    03/14/21 0615 03/14/21 0630 03/14/21 0645 03/14/21 0730   BP:  131/58 118/61 123/77   Pulse:  94 100 93   Resp:       Temp:   98.2  F (36.8  C)    TempSrc:   Temporal    SpO2: 96% 94% 97% 96%   Weight:           Cardiac Rhythm:  ,      Pain level:    Patient confused: No. Patient Falls Risk: Yes.   Elimination Status: Has voided   Patient Report - Initial Complaint: Abdominal pain. Focused Assessment: A&O. C/O abdominal pain/bloating for a few days with intermittent, fever and chills as well as diarrhea. CT and US show pelvic abcess. OB consulted. Morphine given for pain with some relief. IV abx started.    Tests Performed: CT, US, labs. Abnormal Results:   US Pelvis Cmplt w Transvag & Doppler LmtPel Duplex Limited   Final Result   IMPRESSION:     1. There is approximately 5.3 cm mildly loculated hypoechoic   collection in the cul-de-sac, likely corresponds to the collection   seen on same-day CT, indeterminate, could represent developing   abscess.   2. Ovaries are unremarkable. No adnexal masses.      RUBI ALCARAZ MD      CT Abdomen Pelvis w Contrast   Final Result   IMPRESSION:   1. Significant nonfocal fat stranding in the pelvis centered around   the uterus and adnexa of indeterminate etiology. In addition, there is   mildly loculated fluid in the cul-de-sac, could represent developing   abscess. Findings could be related to PID, recommend further   evaluation with pelvic  ultrasound.   2. The appendix is visualized and appears normal.   3. Splenomegaly.   4. 1.7 cm partially calcified right lower lobe pulmonary granuloma.      RUBI ALCARAZ MD        Labs Ordered and Resulted from Time of ED Arrival Up to the Time of Departure from the ED   CBC WITH PLATELETS DIFFERENTIAL - Abnormal; Notable for the following components:       Result Value    WBC 20.7 (*)     RBC Count 5.38 (*)     MCV 73 (*)     MCH 22.1 (*)     MCHC 30.5 (*)     RDW 15.9 (*)     Absolute Neutrophil 18.3 (*)     All other components within normal limits   COMPREHENSIVE METABOLIC PANEL - Abnormal; Notable for the following components:    Sodium 132 (*)     Potassium 3.0 (*)     Creatinine 1.70 (*)     GFR Estimate 42 (*)     GFR Estimate If Black 49 (*)     Albumin 3.2 (*)     All other components within normal limits   ROUTINE UA WITH MICROSCOPIC - Abnormal; Notable for the following components:    Blood Urine Small (*)     Protein Albumin Urine 300 (*)     WBC Urine 21 (*)     RBC Urine 3 (*)     Bacteria Urine Many (*)     Squamous Epithelial /HPF Urine 10 (*)     Mucous Urine Present (*)     Hyaline Casts 4 (*)     Granular Casts 20 (*)     All other components within normal limits   LIPASE - Abnormal; Notable for the following components:    Lipase 53 (*)     All other components within normal limits   LACTIC ACID WHOLE BLOOD   HCG QUALITATIVE   INFLUENZA A/B & SARS-COV2 PCR MULTIPLEX   PULSE OXIMETRY NURSING   PERIPHERAL IV CATHETER   BLOOD CULTURE   BLOOD CULTURE   URINE CULTURE AEROBIC BACTERIAL   WET PREP   NEISSERIA GONORRHOEAE PCR   CHLAMYDIA TRACHOMATIS PCR     .   Treatments provided: IVF, IVabx, pain and nausea control  Family Comments: NA  OBS brochure/video discussed/provided to patient:  No  ED Medications:   Medications   morphine (PF) injection 4 mg (4 mg Intravenous Given 3/14/21 0801)   cefoTEtan (CEFOTAN) 2 g vial to attach to  ml bag (2 g Intravenous New Bag 3/14/21 0843)    doxycycline (VIBRAMYCIN) 100 mg vial to attach to  mL bag (has no administration in time range)   0.9% sodium chloride BOLUS (0 mLs Intravenous Stopped 3/14/21 0636)   ondansetron (ZOFRAN) injection 4 mg (4 mg Intravenous Given 3/14/21 0601)   ketorolac (TORADOL) injection 15 mg (15 mg Intravenous Given 3/14/21 0601)   0.9% sodium chloride BOLUS (0 mLs Intravenous Stopped 3/14/21 0726)   iopamidol (ISOVUE-370) solution 100 mL (100 mLs Intravenous Given 3/14/21 0653)   sodium chloride (PF) 0.9% PF flush 65 mL (65 mLs Intravenous Given 3/14/21 0653)     Drips infusing:  No  For the majority of the shift, the patient's behavior Green. Interventions performed were NA.    Sepsis treatment initiated: No     Patient tested for COVID 19 prior to admission: YES    ED Nurse Name/Phone Number: Starr Martin RN,   9:11 AM      RECEIVING UNIT ED HANDOFF REVIEW    Above ED Nurse Handoff Report was reviewed: YES  Reviewed by: Nadja Obrien RN on March 14, 2021 at 9:44 AM

## 2021-03-15 ENCOUNTER — APPOINTMENT (OUTPATIENT)
Dept: CT IMAGING | Facility: CLINIC | Age: 21
DRG: 757 | End: 2021-03-15
Attending: INTERNAL MEDICINE
Payer: COMMERCIAL

## 2021-03-15 LAB
ANION GAP SERPL CALCULATED.3IONS-SCNC: 8 MMOL/L (ref 3–14)
ANISOCYTOSIS BLD QL SMEAR: SLIGHT
BACTERIA SPEC CULT: NORMAL
BASOPHILS # BLD AUTO: 0 10E9/L (ref 0–0.2)
BASOPHILS NFR BLD AUTO: 0.1 %
BUN SERPL-MCNC: 14 MG/DL (ref 7–30)
C TRACH DNA SPEC QL NAA+PROBE: NEGATIVE
CALCIUM SERPL-MCNC: 7.9 MG/DL (ref 8.5–10.1)
CHLORIDE SERPL-SCNC: 108 MMOL/L (ref 94–109)
CO2 SERPL-SCNC: 22 MMOL/L (ref 20–32)
CREAT SERPL-MCNC: 1.05 MG/DL (ref 0.52–1.04)
DIFFERENTIAL METHOD BLD: ABNORMAL
EOSINOPHIL # BLD AUTO: 0 10E9/L (ref 0–0.7)
EOSINOPHIL NFR BLD AUTO: 0.2 %
ERYTHROCYTE [DISTWIDTH] IN BLOOD BY AUTOMATED COUNT: 16 % (ref 10–15)
GFR SERPL CREATININE-BSD FRML MDRD: 76 ML/MIN/{1.73_M2}
GLUCOSE SERPL-MCNC: 84 MG/DL (ref 70–99)
HCT VFR BLD AUTO: 30 % (ref 35–47)
HGB BLD-MCNC: 9.1 G/DL (ref 11.7–15.7)
IMM GRANULOCYTES # BLD: 0.1 10E9/L (ref 0–0.4)
IMM GRANULOCYTES NFR BLD: 1 %
INTERPRETATION ECG - MUSE: NORMAL
LACTATE BLD-SCNC: 0.9 MMOL/L (ref 0.7–2)
LYMPHOCYTES # BLD AUTO: 0.8 10E9/L (ref 0.8–5.3)
LYMPHOCYTES NFR BLD AUTO: 7.6 %
Lab: NORMAL
MCH RBC QN AUTO: 22 PG (ref 26.5–33)
MCHC RBC AUTO-ENTMCNC: 30.3 G/DL (ref 31.5–36.5)
MCV RBC AUTO: 73 FL (ref 78–100)
MONOCYTES # BLD AUTO: 0.8 10E9/L (ref 0–1.3)
MONOCYTES NFR BLD AUTO: 7.5 %
N GONORRHOEA DNA SPEC QL NAA+PROBE: NEGATIVE
NEUTROPHILS # BLD AUTO: 8.8 10E9/L (ref 1.6–8.3)
NEUTROPHILS NFR BLD AUTO: 83.6 %
NRBC # BLD AUTO: 0 10*3/UL
NRBC BLD AUTO-RTO: 0 /100
OVALOCYTES BLD QL SMEAR: SLIGHT
PLATELET # BLD AUTO: 207 10E9/L (ref 150–450)
PLATELET # BLD EST: ABNORMAL 10*3/UL
POTASSIUM SERPL-SCNC: 3.1 MMOL/L (ref 3.4–5.3)
POTASSIUM SERPL-SCNC: 3.3 MMOL/L (ref 3.4–5.3)
POTASSIUM SERPL-SCNC: 3.6 MMOL/L (ref 3.4–5.3)
RBC # BLD AUTO: 4.14 10E12/L (ref 3.8–5.2)
SODIUM SERPL-SCNC: 138 MMOL/L (ref 133–144)
SPECIMEN SOURCE: NORMAL
WBC # BLD AUTO: 10.5 10E9/L (ref 4–11)

## 2021-03-15 PROCEDURE — 250N000011 HC RX IP 250 OP 636: Performed by: OBSTETRICS & GYNECOLOGY

## 2021-03-15 PROCEDURE — 36415 COLL VENOUS BLD VENIPUNCTURE: CPT | Performed by: OBSTETRICS & GYNECOLOGY

## 2021-03-15 PROCEDURE — 250N000011 HC RX IP 250 OP 636: Performed by: INTERNAL MEDICINE

## 2021-03-15 PROCEDURE — 99207 PR APP CREDIT; MD BILLING SHARED VISIT: CPT | Performed by: INTERNAL MEDICINE

## 2021-03-15 PROCEDURE — 250N000013 HC RX MED GY IP 250 OP 250 PS 637: Performed by: OBSTETRICS & GYNECOLOGY

## 2021-03-15 PROCEDURE — 85025 COMPLETE CBC W/AUTO DIFF WBC: CPT | Performed by: OBSTETRICS & GYNECOLOGY

## 2021-03-15 PROCEDURE — 250N000013 HC RX MED GY IP 250 OP 250 PS 637: Performed by: INTERNAL MEDICINE

## 2021-03-15 PROCEDURE — 80048 BASIC METABOLIC PNL TOTAL CA: CPT | Performed by: OBSTETRICS & GYNECOLOGY

## 2021-03-15 PROCEDURE — 84132 ASSAY OF SERUM POTASSIUM: CPT | Performed by: OBSTETRICS & GYNECOLOGY

## 2021-03-15 PROCEDURE — 99233 SBSQ HOSP IP/OBS HIGH 50: CPT | Performed by: INTERNAL MEDICINE

## 2021-03-15 PROCEDURE — 83605 ASSAY OF LACTIC ACID: CPT | Performed by: OBSTETRICS & GYNECOLOGY

## 2021-03-15 PROCEDURE — 258N000003 HC RX IP 258 OP 636: Performed by: HOSPITALIST

## 2021-03-15 PROCEDURE — 120N000006 HC R&B PEDS

## 2021-03-15 PROCEDURE — 258N000003 HC RX IP 258 OP 636: Performed by: INTERNAL MEDICINE

## 2021-03-15 PROCEDURE — 74176 CT ABD & PELVIS W/O CONTRAST: CPT

## 2021-03-15 PROCEDURE — 36415 COLL VENOUS BLD VENIPUNCTURE: CPT | Performed by: INTERNAL MEDICINE

## 2021-03-15 PROCEDURE — 87040 BLOOD CULTURE FOR BACTERIA: CPT | Performed by: INTERNAL MEDICINE

## 2021-03-15 RX ORDER — HYDROMORPHONE HCL IN WATER/PF 6 MG/30 ML
0.2 PATIENT CONTROLLED ANALGESIA SYRINGE INTRAVENOUS ONCE
Status: COMPLETED | OUTPATIENT
Start: 2021-03-15 | End: 2021-03-15

## 2021-03-15 RX ORDER — LEVOFLOXACIN 5 MG/ML
500 INJECTION, SOLUTION INTRAVENOUS EVERY 24 HOURS
Status: DISCONTINUED | OUTPATIENT
Start: 2021-03-15 | End: 2021-03-20 | Stop reason: HOSPADM

## 2021-03-15 RX ORDER — LORAZEPAM 0.5 MG/1
0.5 TABLET ORAL EVERY 6 HOURS PRN
Status: DISCONTINUED | OUTPATIENT
Start: 2021-03-15 | End: 2021-03-20 | Stop reason: HOSPADM

## 2021-03-15 RX ORDER — ONDANSETRON 4 MG/1
4 TABLET, ORALLY DISINTEGRATING ORAL EVERY 6 HOURS PRN
Status: DISCONTINUED | OUTPATIENT
Start: 2021-03-15 | End: 2021-03-20 | Stop reason: HOSPADM

## 2021-03-15 RX ORDER — ALBUTEROL SULFATE 90 UG/1
2 AEROSOL, METERED RESPIRATORY (INHALATION) EVERY 6 HOURS PRN
Status: DISCONTINUED | OUTPATIENT
Start: 2021-03-15 | End: 2021-03-20 | Stop reason: HOSPADM

## 2021-03-15 RX ORDER — CALCIUM CARBONATE 500 MG/1
500 TABLET, CHEWABLE ORAL 3 TIMES DAILY PRN
Status: DISCONTINUED | OUTPATIENT
Start: 2021-03-15 | End: 2021-03-20 | Stop reason: HOSPADM

## 2021-03-15 RX ORDER — HYDROXYZINE HYDROCHLORIDE 25 MG/1
100 TABLET, FILM COATED ORAL EVERY 6 HOURS PRN
Status: DISCONTINUED | OUTPATIENT
Start: 2021-03-15 | End: 2021-03-20 | Stop reason: HOSPADM

## 2021-03-15 RX ORDER — HYDROXYZINE HYDROCHLORIDE 25 MG/ML
100 INJECTION, SOLUTION INTRAMUSCULAR EVERY 6 HOURS PRN
Status: DISCONTINUED | OUTPATIENT
Start: 2021-03-15 | End: 2021-03-20 | Stop reason: HOSPADM

## 2021-03-15 RX ORDER — ONDANSETRON 2 MG/ML
4 INJECTION INTRAMUSCULAR; INTRAVENOUS EVERY 6 HOURS PRN
Status: DISCONTINUED | OUTPATIENT
Start: 2021-03-15 | End: 2021-03-20 | Stop reason: HOSPADM

## 2021-03-15 RX ORDER — HYDROXYZINE HYDROCHLORIDE 25 MG/ML
100 INJECTION, SOLUTION INTRAMUSCULAR EVERY 6 HOURS PRN
Status: DISCONTINUED | OUTPATIENT
Start: 2021-03-15 | End: 2021-03-15

## 2021-03-15 RX ORDER — PROCHLORPERAZINE MALEATE 10 MG
10 TABLET ORAL EVERY 6 HOURS PRN
Status: DISCONTINUED | OUTPATIENT
Start: 2021-03-15 | End: 2021-03-20 | Stop reason: HOSPADM

## 2021-03-15 RX ORDER — POTASSIUM CHLORIDE 7.45 MG/ML
10 INJECTION INTRAVENOUS
Status: COMPLETED | OUTPATIENT
Start: 2021-03-15 | End: 2021-03-15

## 2021-03-15 RX ORDER — HYDROMORPHONE HYDROCHLORIDE 1 MG/ML
0.5 INJECTION, SOLUTION INTRAMUSCULAR; INTRAVENOUS; SUBCUTANEOUS
Status: DISCONTINUED | OUTPATIENT
Start: 2021-03-15 | End: 2021-03-20 | Stop reason: HOSPADM

## 2021-03-15 RX ORDER — HYDROXYZINE HYDROCHLORIDE 25 MG/1
25 TABLET, FILM COATED ORAL AT BEDTIME
Status: ACTIVE | OUTPATIENT
Start: 2021-03-15 | End: 2021-03-16

## 2021-03-15 RX ORDER — ERTAPENEM 1 G/1
1 INJECTION, POWDER, LYOPHILIZED, FOR SOLUTION INTRAMUSCULAR; INTRAVENOUS EVERY 24 HOURS
Status: DISCONTINUED | OUTPATIENT
Start: 2021-03-15 | End: 2021-03-15

## 2021-03-15 RX ORDER — HYDROXYZINE HYDROCHLORIDE 10 MG/1
10 TABLET, FILM COATED ORAL EVERY 6 HOURS PRN
Status: DISCONTINUED | OUTPATIENT
Start: 2021-03-15 | End: 2021-03-15

## 2021-03-15 RX ORDER — POTASSIUM CHLORIDE 1500 MG/1
40 TABLET, EXTENDED RELEASE ORAL ONCE
Status: COMPLETED | OUTPATIENT
Start: 2021-03-15 | End: 2021-03-15

## 2021-03-15 RX ORDER — PROCHLORPERAZINE 25 MG
25 SUPPOSITORY, RECTAL RECTAL EVERY 12 HOURS PRN
Status: DISCONTINUED | OUTPATIENT
Start: 2021-03-15 | End: 2021-03-20 | Stop reason: HOSPADM

## 2021-03-15 RX ADMIN — POTASSIUM CHLORIDE 40 MEQ: 1500 TABLET, EXTENDED RELEASE ORAL at 14:04

## 2021-03-15 RX ADMIN — ERTAPENEM SODIUM 1 G: 1 INJECTION, POWDER, LYOPHILIZED, FOR SOLUTION INTRAMUSCULAR; INTRAVENOUS at 04:13

## 2021-03-15 RX ADMIN — ONDANSETRON 4 MG: 2 INJECTION INTRAMUSCULAR; INTRAVENOUS at 09:04

## 2021-03-15 RX ADMIN — HYDROMORPHONE HYDROCHLORIDE 0.5 MG: 1 INJECTION, SOLUTION INTRAMUSCULAR; INTRAVENOUS; SUBCUTANEOUS at 04:56

## 2021-03-15 RX ADMIN — POTASSIUM CHLORIDE 10 MEQ: 7.46 INJECTION, SOLUTION INTRAVENOUS at 04:58

## 2021-03-15 RX ADMIN — SODIUM CHLORIDE 500 ML: 9 INJECTION, SOLUTION INTRAVENOUS at 03:16

## 2021-03-15 RX ADMIN — PROCHLORPERAZINE EDISYLATE 10 MG: 5 INJECTION INTRAMUSCULAR; INTRAVENOUS at 14:04

## 2021-03-15 RX ADMIN — METRONIDAZOLE 500 MG: 500 INJECTION, SOLUTION INTRAVENOUS at 21:26

## 2021-03-15 RX ADMIN — HYDROMORPHONE HYDROCHLORIDE 0.2 MG: 0.2 INJECTION, SOLUTION INTRAMUSCULAR; INTRAVENOUS; SUBCUTANEOUS at 03:05

## 2021-03-15 RX ADMIN — HYDROMORPHONE HYDROCHLORIDE 0.5 MG: 1 INJECTION, SOLUTION INTRAMUSCULAR; INTRAVENOUS; SUBCUTANEOUS at 07:49

## 2021-03-15 RX ADMIN — ALBUTEROL SULFATE 2 PUFF: 90 AEROSOL, METERED RESPIRATORY (INHALATION) at 18:38

## 2021-03-15 RX ADMIN — CALCIUM CARBONATE (ANTACID) CHEW TAB 500 MG 500 MG: 500 CHEW TAB at 10:52

## 2021-03-15 RX ADMIN — HYDROMORPHONE HYDROCHLORIDE 0.2 MG: 0.2 INJECTION, SOLUTION INTRAMUSCULAR; INTRAVENOUS; SUBCUTANEOUS at 02:32

## 2021-03-15 RX ADMIN — SODIUM CHLORIDE 500 ML: 9 INJECTION, SOLUTION INTRAVENOUS at 03:03

## 2021-03-15 RX ADMIN — METRONIDAZOLE 500 MG: 500 INJECTION, SOLUTION INTRAVENOUS at 10:35

## 2021-03-15 RX ADMIN — SODIUM CHLORIDE: 9 INJECTION, SOLUTION INTRAVENOUS at 21:26

## 2021-03-15 RX ADMIN — LEVOFLOXACIN 500 MG: 500 INJECTION, SOLUTION INTRAVENOUS at 11:55

## 2021-03-15 RX ADMIN — POTASSIUM CHLORIDE 10 MEQ: 7.46 INJECTION, SOLUTION INTRAVENOUS at 05:56

## 2021-03-15 RX ADMIN — LORAZEPAM 0.5 MG: 0.5 TABLET ORAL at 18:38

## 2021-03-15 RX ADMIN — SODIUM CHLORIDE: 9 INJECTION, SOLUTION INTRAVENOUS at 14:08

## 2021-03-15 RX ADMIN — ACETAMINOPHEN 650 MG: 325 TABLET, FILM COATED ORAL at 02:32

## 2021-03-15 RX ADMIN — ACETAMINOPHEN 650 MG: 325 TABLET, FILM COATED ORAL at 18:38

## 2021-03-15 RX ADMIN — POTASSIUM CHLORIDE 10 MEQ: 7.46 INJECTION, SOLUTION INTRAVENOUS at 07:01

## 2021-03-15 RX ADMIN — POTASSIUM CHLORIDE 10 MEQ: 7.46 INJECTION, SOLUTION INTRAVENOUS at 08:27

## 2021-03-15 RX ADMIN — ONDANSETRON 4 MG: 2 INJECTION INTRAMUSCULAR; INTRAVENOUS at 03:02

## 2021-03-15 NOTE — CONSULTS
Care Management Initial Consult    General Information  Assessment completed with: Patient, Patient, Yoly  Type of CM/SW Visit: Initial Assessment    Primary Care Provider verified and updated as needed:   No primary physican  Readmission within the last 30 days:        Reason for Consult: mental health concerns  Advance Care Planning:       General Information Comments: 20 year old who lives with father    Communication Assessment  Patient's communication style: spoken language (English or Bilingual)    Hearing Difficulty or Deaf: no   Wear Glasses or Blind: no    Cognitive  Cognitive/Neuro/Behavioral: WDL Alert and oriented x4, verbalized appropriately and is able to make her own decisions.                     Living Environment:   People in home: parent(s), significant other (Alphonse) father (Javid)   Current living Arrangements: house      Able to return to prior arrangements: yes  Living Arrangement Comments: currently living with father which she said it was ok. Currently she is unemployed.    Family/Social Support:  Care provided by: self  Provides care for: no one  Marital Status: Single  Significant Other, Parent(s)       Alphonse (SO)  Description of Support System: Supportive, Involved    Support Assessment: Adequate family and caregiver support    Current Resources:   Patient receiving home care services: No     Community Resources: None  Equipment currently used at home: none  Supplies currently used at home: None    Employment/Financial:  Employment Status: unemployed        Financial Concerns: No concerns identified           Lifestyle & Psychosocial Needs:        Socioeconomic History     Marital status: Single     Spouse name: Not on file     Number of children: Not on file     Years of education: Not on file     Highest education level: Not on file     Tobacco Use     Smoking status: Never Smoker     Smokeless tobacco: Never Used       Functional Status:  Prior to admission patient needed  assistance: No             Mental Health Status:  Mental Health Status: Current Concern  Mental Health Management: (anxiety and depression)    Chemical Dependency Status:  Chemical Dependency Status: Past Concern             Values/Beliefs:  Spiritual, Cultural Beliefs, Spiritism Practices, Values that affect care:                 Additional Information:  Consult regarding mental health issues. Met with patient and her SO, Alphonse.She gave permission to talk in front of SO. Patient reported that she has always dealt with some kind of depression/anxiety. She reports that she usually stuffs it but recently it has been harder to stuff due to her health problems. In the past she reports that she did she a therapist but is has been years. We discussed starting again, she thought that was a good idea and asked for resources. She also expressed that she would like some resources for eating disorders. She reports that she either eats too much or not at all. Resources will be given before discharge. She is not feeling suicidal or homicidal, she also stated that she has never tried to hurt herself or others.     Asked physician for medications for anxiety and discuss.    SW will continue to follow as needed.    ANDRE Herrera   Inpatient Care Coordination   Supervisor  [facility]  [phone]      JUSTIN Downing

## 2021-03-15 NOTE — PROGRESS NOTES
Paged Dr. Winchester to discuss pelvic abscess drain with Dr. Clark as he feels area is too small and deep to be accessed percutaneously.

## 2021-03-15 NOTE — PROVIDER NOTIFICATION
DATE:  3/14/2021   TIME OF RECEIPT FROM LAB:  1920  LAB TEST:  Procalcitonin  LAB VALUE:  31.96  RESULTS GIVEN WITH READ-BACK TO (PROVIDER):  Admitting Hospitalist.   TIME LAB VALUE REPORTED TO PROVIDER:   1930

## 2021-03-15 NOTE — PLAN OF CARE
Slept until 0213 when woke for vital signs. Nurse noted RR 32 while sleeping. Woke and took temperature and oral temperature was 103.2  triggering the lactic acid protocol. RLQ quadrant pain rated 7. Also had a headache rated 6. Dilaudid and Tylenol given. Then c/o nausea. C/O thirst.Relief after zofran. Desats to 88% so started on 2 LPM O2. 1000 ml fluid flush given. Urine is hazy tiffany colored. Potassium bumps started and will need redraw at 10 AM. Plan for IR at 1030. Remains NPO. ANKIT wipes done at 0500.

## 2021-03-15 NOTE — PROGRESS NOTES
[facility] [place]    Medicine Progress Note - Hospitalist Service       Date of Admission:  3/14/2021  Length of stay: 1 days    Assessment & Plan   Yoly Tipton is a 20-year-old female 2 and half months status post elective termination of 13-week gestation pregnancy, with history of hidradenitis suppurativa recently started on Humira, who presented to the hospital complaining of fever, chills and abdominal pain and was found to have abscess formation at the level of the cul-de-sac with fat stranding around the uterus.  Patient is admitted to the OB/GYN service and internal medicine is consulted for medical management.    Pelvic abscess  Sepsis  - Patient met sepsis criteria in the basis of white count of 20 and fever 203.  - CT scan of the abdomen/pelvis as well as ultrasound showed 5.3 cm x 1.4 x 4.8 loculated hypoechoic collection in the cul-de-sac.   - She was started initially on cefotetan and doxycycline, then switched to metronidazole/levofloxacin, then ertapenem, now back to metronidazole/levofloxacin.  - Gonorrhea and Chlamydia PCR's are negative.  - OB/GYN spoke to interventional radiology who felt that the abscess was too small and deep to be accessed percutaneously.  Therefore we will manage the patient with IV antibiotics and monitor her inflammatory markers, fever, and pain--white count has trended down so far and fever improved after initial spike.   - Continue levofloxacin/metronidazole    Acute kidney injury  - Patient's creatinine was 1.7 on admission but trended down to 1.0 after fluids, suggestive of prerenal etiology.    Hidradenitis suppurativa  - Recently started on Humira, hold while inpatient    Thank you for the consult. We will continue to folow this patient.     Corwin Busch MD  Hospitalist Service  [facility] [place]  ______________________________________________________________________    Interval History   Had a fever overnight.  Not feeling good this  morning, having some abdominal pain was sweaty, just generally unwell.    Data reviewed today: I reviewed all medications, new labs and imaging results over the last 24 hours.     Physical Exam   /60   Pulse 89   Temp 98.1  F (36.7  C) (Oral)   Resp 20   Wt 108.9 kg (240 lb)   SpO2 98%   BMI 38.74 kg/m    240 lbs 0 oz       General: Appears ill but not in acute distress.    HEENT: No scleral icterus. Oropharynx moist.     Neck: Supple. Normal range of motion.     Pulmonary: Normal work of breathing. Clear to auscultation bilaterally.    Cardiovascular: Regular rate and rhythm without murmur or extra heart sounds.    Abdomen: Soft and non-tender.    Extremities: No peripheral edema. No clubbing or cyanosis.     Neurologic: Awake, alert, appropriate.    Skin: Warm and dry.    Psychiatric: Normal affect and mood.     Data    Recent Labs   Lab 03/15/21  1212 03/15/21  0312 03/14/21  1831 03/14/21  0551   WBC  --  10.5  --  20.7*   HGB  --  9.1*  --  11.9   MCV  --  73*  --  73*   PLT  --  207  --  272   NA  --  138 136 132*   POTASSIUM 3.3* 3.1*  --  3.0*   CHLORIDE  --  108  --  100   CO2  --  22  --  20   BUN  --  14  --  20   CR  --  1.05* 1.17* 1.70*   ANIONGAP  --  8  --  12   JOSEPH  --  7.9*  --  9.2   GLC  --  84  --  97   ALBUMIN  --   --   --  3.2*   PROTTOTAL  --   --   --  8.8   BILITOTAL  --   --   --  0.5   ALKPHOS  --   --   --  80   ALT  --   --   --  19   AST  --   --   --  16   LIPASE  --   --   --  53*     Recent Results (from the past 24 hour(s))   CT Abdomen Pelvis w/o Contrast    Narrative    EXAM: CT ABDOMEN PELVIS W/O CONTRAST  LOCATION: [facility]  DATE/TIME: 3/15/2021 4:21 AM    INDICATION: Abdominal pain, fever, right lower quadrant pain, tachycardia  COMPARISON: 03/14/2021  TECHNIQUE: CT scan of the abdomen and pelvis was performed without IV contrast. Multiplanar reformats were obtained. Dose reduction techniques were used.  CONTRAST: None.    FINDINGS:   LOWER CHEST:  1.7 cm right lower lobe centrally calcified nodule is unchanged. Increasing bibasilar atelectasis. New trace bilateral pleural effusions.    HEPATOBILIARY: Dependent sludge or small stones within the gallbladder. Gallbladder is nondilated.    PANCREAS: Unremarkable    SPLEEN: Mild splenomegaly reaching 14.2 cm.    ADRENAL GLANDS: Unremarkable    KIDNEYS/BLADDER: No hydronephrosis. No bladder stones.    BOWEL: No bowel obstruction. Normal appendix. There is increasing small bowel wall thickening within the pelvis, with a large amount of adjacent inflammatory stranding in the greater omentum and small bowel mesentery. Pelvic fluid collection in the   cul-de-sac is less well-visualized without contrast, although is likely unchanged in size at 4.8 cm. Small amount of abdominal ascites.    LYMPH NODES: Numerous prominent pelvic sidewall on the mesenteric and retroperitoneal nodes are redemonstrated,, likely reactive in the setting of infection.    VASCULATURE: No abdominal aortic aneurysm    PELVIC ORGANS: Extensive inflammatory stranding about the uterus and adnexa, as well as within the anterior pelvis.    MUSCULOSKELETAL: No acute bony abnormalities.      Impression    IMPRESSION:   1.  Extensive lower abdominal abdominal/pelvic inflammatory stranding, pelvic fluid collection, likely reactive small bowel wall thickening, and small volume ascites redemonstrated. Findings remain most consistent with pelvic inflammatory disease with   pelvic abscess.  2.  New small bilateral pleural effusions.  3.  Splenomegaly.         Medications      Current Facility-Administered Medications   Medication Dose Route Frequency     levofloxacin  500 mg Intravenous Q24H     metroNIDAZOLE  500 mg Intravenous Q12H     polyethylene glycol  17 g Oral Daily     potassium chloride  40 mEq Oral Once

## 2021-03-15 NOTE — PROGRESS NOTES
Called for fever and RLQ pain 7/10.     Chart reviewed and pt examined.    Temp: 102.6  F (39.2  C) Temp src: Oral BP: 137/66 Pulse: 109   Resp: (!) 32 SpO2: 96 % O2 Device: None (Room air)       Gen - Alert, awake, in distress due to pain.  Lungs - CTA B.  Heart - tachycardic, S1+S2 nml, no m/g/r.  Abd - soft, + ttp in RLQ, + rebound tenderness focal in RLQ, hypoactive BS.    A/P - will increase IV dilaudid dose, IVF bolus. Lactic acid pending. Will get CT A/P without contrast STAT. Change IV levaquin to IV invaz.    I did talk to Dr Botello, and will update her of any changes.

## 2021-03-15 NOTE — PROVIDER NOTIFICATION
Notified provider Ayan of critical procalcitonin value of 31.96. She did not order the lab and requested I try Dr. Yoo. Notified Dr. Yoo directly of critical lab value. Awaiting call back.      Repeated call at 2035 and received a call back from Dr. Olvera (sp?). No changes to POC.

## 2021-03-15 NOTE — PLAN OF CARE
Afebrile. VSS. Pain RUQ and RLQ managed with Oxycodone. Abdomen obese, soft, tender. BS active all quads. Tolerating regular diet. Voiding frequently. Appeared to sleep comfortably between cares. Plan for IR drain placement in AM.

## 2021-03-15 NOTE — PROVIDER NOTIFICATION
Hospitalist notified of fever 103.2 oral. Tachycardia . RLQ pain 7. H/a rated 6. Dilaudid 0.2 mg and Tylenol given.

## 2021-03-15 NOTE — PROVIDER NOTIFICATION
Dr Botello Notified of temperature 103.2 oral. RLQ pain rated 7. H/A rated 6. Labs being drawn. Fluid flush started.

## 2021-03-15 NOTE — PLAN OF CARE
Vital signs: Stable; B/P: 130/65, Temp: 98.8, HR: 78, RR: 20  Pain Control: IV Dilaudid x1; switch to Oxycodone and Tylenol since no longer NPO.   Diet: Tolerating small bites and sips. Intermittent nausea present.   Output: Voiding adequately.   Activity: Up ambulating in room with stand by assist.   Updates: K+ 3.1; replaced with K+ IV and recheck for 3.3. K+ PO given again and will recheck at 1800. RLQ and LLQ tender. Abscess present but too small and deep to place drain. IV antibiotics switched back to Flagyl and Levaquin. Invanz discontinued. Zofran and Compazine given to intermittent nausea. Emesis x1 (400ml). Plan for pelvic/abdomen US tomorrow.  Plan: Continue to monitor and assess VS/pain.

## 2021-03-15 NOTE — PROGRESS NOTES
Update    1)IR unable to drain to abscess  2)Continue IV abx-markers of improvement will be improvement in fever, WBC, pain, and size of abscess  3)Will order US to reassess size of abscess

## 2021-03-15 NOTE — PROGRESS NOTES
HD 2  Patient reports ongoing need for Dilaudid    Tm 103  Abdomen obese soft, mildly tender    Patient has a pelvic abscess for which she is currently on IV Ertapenem. In light of ongoing fever, plan to consult IR for drainage of abscess today.

## 2021-03-16 ENCOUNTER — APPOINTMENT (OUTPATIENT)
Dept: ULTRASOUND IMAGING | Facility: CLINIC | Age: 21
DRG: 757 | End: 2021-03-16
Attending: OBSTETRICS & GYNECOLOGY
Payer: COMMERCIAL

## 2021-03-16 LAB
CREAT SERPL-MCNC: 0.81 MG/DL (ref 0.52–1.04)
ERYTHROCYTE [DISTWIDTH] IN BLOOD BY AUTOMATED COUNT: 16.5 % (ref 10–15)
FERRITIN SERPL-MCNC: 187 NG/ML (ref 12–150)
GFR SERPL CREATININE-BSD FRML MDRD: >90 ML/MIN/{1.73_M2}
HCT VFR BLD AUTO: 27.7 % (ref 35–47)
HGB BLD-MCNC: 8.2 G/DL (ref 11.7–15.7)
IRON SATN MFR SERPL: 15 % (ref 15–46)
IRON SERPL-MCNC: 35 UG/DL (ref 35–180)
MCH RBC QN AUTO: 21.4 PG (ref 26.5–33)
MCHC RBC AUTO-ENTMCNC: 29.6 G/DL (ref 31.5–36.5)
MCV RBC AUTO: 72 FL (ref 78–100)
PLATELET # BLD AUTO: 247 10E9/L (ref 150–450)
POTASSIUM SERPL-SCNC: 3.3 MMOL/L (ref 3.4–5.3)
POTASSIUM SERPL-SCNC: 3.9 MMOL/L (ref 3.4–5.3)
RBC # BLD AUTO: 3.84 10E12/L (ref 3.8–5.2)
TIBC SERPL-MCNC: 233 UG/DL (ref 240–430)
WBC # BLD AUTO: 10.3 10E9/L (ref 4–11)

## 2021-03-16 PROCEDURE — 250N000013 HC RX MED GY IP 250 OP 250 PS 637: Performed by: OBSTETRICS & GYNECOLOGY

## 2021-03-16 PROCEDURE — 258N000003 HC RX IP 258 OP 636: Performed by: INTERNAL MEDICINE

## 2021-03-16 PROCEDURE — 76857 US EXAM PELVIC LIMITED: CPT

## 2021-03-16 PROCEDURE — 250N000013 HC RX MED GY IP 250 OP 250 PS 637: Performed by: INTERNAL MEDICINE

## 2021-03-16 PROCEDURE — 85027 COMPLETE CBC AUTOMATED: CPT | Performed by: INTERNAL MEDICINE

## 2021-03-16 PROCEDURE — 82565 ASSAY OF CREATININE: CPT | Performed by: INTERNAL MEDICINE

## 2021-03-16 PROCEDURE — 36415 COLL VENOUS BLD VENIPUNCTURE: CPT | Performed by: INTERNAL MEDICINE

## 2021-03-16 PROCEDURE — 82728 ASSAY OF FERRITIN: CPT | Performed by: INTERNAL MEDICINE

## 2021-03-16 PROCEDURE — 84132 ASSAY OF SERUM POTASSIUM: CPT | Performed by: OBSTETRICS & GYNECOLOGY

## 2021-03-16 PROCEDURE — 258N000003 HC RX IP 258 OP 636: Performed by: HOSPITALIST

## 2021-03-16 PROCEDURE — 83540 ASSAY OF IRON: CPT | Performed by: INTERNAL MEDICINE

## 2021-03-16 PROCEDURE — 99232 SBSQ HOSP IP/OBS MODERATE 35: CPT | Performed by: INTERNAL MEDICINE

## 2021-03-16 PROCEDURE — 120N000006 HC R&B PEDS

## 2021-03-16 PROCEDURE — 83550 IRON BINDING TEST: CPT | Performed by: INTERNAL MEDICINE

## 2021-03-16 PROCEDURE — 36415 COLL VENOUS BLD VENIPUNCTURE: CPT | Performed by: OBSTETRICS & GYNECOLOGY

## 2021-03-16 PROCEDURE — 250N000011 HC RX IP 250 OP 636: Performed by: INTERNAL MEDICINE

## 2021-03-16 PROCEDURE — 84132 ASSAY OF SERUM POTASSIUM: CPT | Performed by: INTERNAL MEDICINE

## 2021-03-16 RX ORDER — POTASSIUM CHLORIDE 1500 MG/1
40 TABLET, EXTENDED RELEASE ORAL ONCE
Status: COMPLETED | OUTPATIENT
Start: 2021-03-16 | End: 2021-03-16

## 2021-03-16 RX ADMIN — METRONIDAZOLE 500 MG: 500 INJECTION, SOLUTION INTRAVENOUS at 21:49

## 2021-03-16 RX ADMIN — ACETAMINOPHEN 650 MG: 325 TABLET, FILM COATED ORAL at 00:35

## 2021-03-16 RX ADMIN — POLYETHYLENE GLYCOL 3350 17 G: 17 POWDER, FOR SOLUTION ORAL at 10:52

## 2021-03-16 RX ADMIN — ACETAMINOPHEN 650 MG: 325 TABLET, FILM COATED ORAL at 19:26

## 2021-03-16 RX ADMIN — ACETAMINOPHEN 650 MG: 325 TABLET, FILM COATED ORAL at 09:40

## 2021-03-16 RX ADMIN — CALCIUM CARBONATE (ANTACID) CHEW TAB 500 MG 500 MG: 500 CHEW TAB at 00:36

## 2021-03-16 RX ADMIN — POTASSIUM CHLORIDE 40 MEQ: 1500 TABLET, EXTENDED RELEASE ORAL at 10:43

## 2021-03-16 RX ADMIN — OXYCODONE HYDROCHLORIDE 5 MG: 5 TABLET ORAL at 00:35

## 2021-03-16 RX ADMIN — SODIUM CHLORIDE, POTASSIUM CHLORIDE, SODIUM LACTATE AND CALCIUM CHLORIDE 1000 ML: 600; 310; 30; 20 INJECTION, SOLUTION INTRAVENOUS at 13:50

## 2021-03-16 RX ADMIN — ALBUTEROL SULFATE 2 PUFF: 90 AEROSOL, METERED RESPIRATORY (INHALATION) at 13:33

## 2021-03-16 RX ADMIN — Medication 1 MG: at 21:33

## 2021-03-16 RX ADMIN — SODIUM CHLORIDE: 9 INJECTION, SOLUTION INTRAVENOUS at 06:33

## 2021-03-16 RX ADMIN — LEVOFLOXACIN 500 MG: 500 INJECTION, SOLUTION INTRAVENOUS at 11:29

## 2021-03-16 RX ADMIN — METRONIDAZOLE 500 MG: 500 INJECTION, SOLUTION INTRAVENOUS at 09:34

## 2021-03-16 RX ADMIN — OXYCODONE HYDROCHLORIDE 10 MG: 5 TABLET ORAL at 09:40

## 2021-03-16 RX ADMIN — HYDROXYZINE HYDROCHLORIDE 100 MG: 25 TABLET, FILM COATED ORAL at 00:35

## 2021-03-16 NOTE — PLAN OF CARE
Vital Signs: Tmax: 99.5.   Pain/Comfort: Received Tylenol and Oxycodone for abdominal pain with adequate relief.   Assessment: Hypoactive bowel sounds. C/O feeling constipated; received Miralax. Bilateral lower abdominal quadrants tender. C/O SOB with ambulation; resolved with Albuterol Inhaler. K+ 3.3; replaced and recheck this afternoon.  Diet: No appetite today. Drinking fluids with encouragement.   Output: Oliguria and strains to void; IVF bolus infusing.   Activity/Ambulation: Ambulated to bathroom; c/o dizziness.   Social: No visitors today. Pleasant with RN.   Plan: IV antibiotics. Ultrasound later today. Encourage fluids. Encourage ambulation. Monitor and provide for needs.

## 2021-03-16 NOTE — PROVIDER NOTIFICATION
Dr. Busch notified of low urine output, concentrated-urine, dizziness and increase in heart rate of over 20bpm when standing; plan for IVF bolus. Will monitor.

## 2021-03-16 NOTE — PLAN OF CARE
Care Management Discharge Note    Discharge Date: 03/17/21(Single/Home)       Discharge Disposition:  Return home with her dad. Patient lives with her dad.    Discharge Services:  Resources given for eating disorders and mental health resources connected with her insurance.    Discharge DME:  None    Discharge Transportation:  family will transport home    Private pay costs discussed: Not applicable    PAS Confirmation Code:  Not applicable  Patient/family educated on Medicare website which has current facility and service quality ratings:  Not applicable    Education Provided on the Discharge Plan:  Discussed follow up after discharge for mental health services and eating disorders programs.  Persons Notified of Discharge Plans: yes  Patient/Family in Agreement with the Plan:  yes, she wasn't sure today if she needed services for eating disorders or mental health services but I encouraged to follow up.    Handoff Referral Completed: No, patient is in the [facility] [facility] network.    Additional Information:  Yesterday during a visit patient had requested resources for eating disorder program and mental health resources. Today she reported that she was not sure if she needed them. I encouraged her to follow up. Asked patient if her anxiety was better as saw physician had given an order for medication to help with her anxiety. She expressed that her anxiety is still not good and she had lots of pain in her stomach.    ANDRE Herrera   Inpatient Care Coordination   Supervisor  [facility]  [phone]        JUSTIN Downing

## 2021-03-16 NOTE — PROGRESS NOTES
D/I) SW following Yoly for discharge planning needs.  SW  Spoke with Yoly who is sleepy and reports she's uncomfortable.  SW offered to make counseling appointment for her but she reports she doesn't want to talk to a counselor   For at least a few months. SW gave counseling list as well as brochure on eating disorder clinic.  A) Pt is alert and engaged in conversation but sleepy. She thanked SW for resources.  P) No further SW needs at this time. SW available as needed.  David ANDERSON Case Management  Inpatient   Maternal Child and ED  [facility]    [phone]

## 2021-03-16 NOTE — PROGRESS NOTES
Care Management Follow Up    Length of Stay (days): 2    Expected Discharge Date: 03/17/21(Single/Home)     Concerns to be Addressed:       Patient plan of care discussed at interdisciplinary rounds: Yes    Anticipated Discharge Disposition:  home     Anticipated Discharge Services:  none  Anticipated Discharge DME:  none    Patient/family educated on Medicare website which has current facility and service quality ratings:  NA  Education Provided on the Discharge Plan:  NA  Patient/Family in Agreement with the Plan:  NA    Referrals Placed by CM/SW:  NA  Private pay costs discussed: Not applicable    Additional Information:  CTS met with pt at bedside to discuss establishing a PCP.  Explained the importance of having a PCP.  Pt was grateful for assistance in appointment support..  Appointment was scheduled for march 24, 2021 at 11 AM with Dr. Melvin at the [facility] clinic.  Information was placed on AVS..    Darlene Cherry RN, BSN, PHN, CTS  Care Coordinator  [facility]  [phone]

## 2021-03-16 NOTE — PROGRESS NOTES
"March 16, 2021      S: No acute overnight events.  Pt c/o lower abdominal pain, feels like she \"has to have a solid BM.\"  She has 1 episode of loose stool yesterday.  However, overall she feels like her pelvic pain has mildly improved since admission.      O: /69   Pulse 89   Temp 98.1  F (36.7  C) (Oral)   Resp 16   Wt 108.9 kg (240 lb)   SpO2 94%   BMI 38.74 kg/m    Gen: NAD, A&O x 3  Abd: obese, soft, mild ttp lower quadrants R>L, suprapubic; some rebound but no guarding  Ext: no CCE, no CT, SCDs on    Lab Results   Component Value Date    WBC 10.5 03/15/2021     Lab Results   Component Value Date    RBC 4.14 03/15/2021     Lab Results   Component Value Date    HGB 9.1 03/15/2021     Lab Results   Component Value Date    HCT 30.0 03/15/2021     Lab Results   Component Value Date    MCV 73 03/15/2021     Lab Results   Component Value Date    MCH 22.0 03/15/2021     Lab Results   Component Value Date    MCHC 30.3 03/15/2021     Lab Results   Component Value Date    RDW 16.0 03/15/2021     Lab Results   Component Value Date     03/15/2021     Last Comprehensive Metabolic Panel:  Sodium   Date Value Ref Range Status   03/15/2021 138 133 - 144 mmol/L Final     Potassium   Date Value Ref Range Status   03/15/2021 3.6 3.4 - 5.3 mmol/L Final     Chloride   Date Value Ref Range Status   03/15/2021 108 94 - 109 mmol/L Final     Carbon Dioxide   Date Value Ref Range Status   03/15/2021 22 20 - 32 mmol/L Final     Anion Gap   Date Value Ref Range Status   03/15/2021 8 3 - 14 mmol/L Final     Glucose   Date Value Ref Range Status   03/15/2021 84 70 - 99 mg/dL Final     Urea Nitrogen   Date Value Ref Range Status   03/15/2021 14 7 - 30 mg/dL Final     Creatinine   Date Value Ref Range Status   03/15/2021 1.05 (H) 0.52 - 1.04 mg/dL Final     GFR Estimate   Date Value Ref Range Status   03/15/2021 76 >60 mL/min/[1.73_m2] Final     Comment:     Non  GFR Calc  Starting 12/18/2018, serum " creatinine based estimated GFR (eGFR) will be   calculated using the Chronic Kidney Disease Epidemiology Collaboration   (CKD-EPI) equation.       Calcium   Date Value Ref Range Status   03/15/2021 7.9 (L) 8.5 - 10.1 mg/dL Final     Lactic acid: 0.9    Chlamydia/gonorrhea PCR: negative    Wet prep: negative    Blood cultures: negative to date, final pending        A/P: 21yo  HD # 2 admitted for pelvic abscess.  AVSS.  - Pt has remained afebrile for >24 hours (last fever 101.5 F at 4:08 AM on 3/15/21).  WBC has improved from 20> 10.5.  Continue IV antibiotics (Levaquin, also on Flagyl).  IR was unable to drain fluid collection yesterday.  Plan is to repeat US today to assess for improvement.  Recommend IV antibiotics until 48 hours afebrile, then plan to transition to PO and f/u as outpt.  - F/u AM labs  - hypokalemia: resolved s/p replacement  - DENNY: creatinine improving (1.7>1.17>1.05)  - tylenol, oxycodone PRN pain.  Avoid NSAIDs  - appreciate hospitalist care and recommendations      VAN PERERA MD

## 2021-03-16 NOTE — PROGRESS NOTES
[facility]    Medicine Progress Note - Hospitalist Service       Date of Admission:  3/14/2021  Length of stay: 2 days    Assessment & Plan   Yoly Tipton is a 20-year-old female 2 and half months status post elective termination of 13-week gestation pregnancy, with history of hidradenitis suppurativa recently started on Humira, who presented to the hospital complaining of fever, chills and abdominal pain and was found to have abscess formation at the level of the cul-de-sac with fat stranding around the uterus.  Patient is admitted to the OB/GYN service and internal medicine is consulted for medical management.    Pelvic abscess  Sepsis  - Patient met sepsis criteria initially on the basis of white count of 20 and fever 203.  - CT scan of the abdomen/pelvis as well as ultrasound showed 5.3 cm x 1.4 x 4.8 loculated hypoechoic collection in the cul-de-sac.   - She was started initially on cefotetan and doxycycline, then switched to metronidazole/levofloxacin, then ertapenem, now back to metronidazole/levofloxacin.  - Gonorrhea and Chlamydia PCR's are negative.  - OB/GYN spoke to interventional radiology who felt that the abscess was too small and deep to be accessed percutaneously.  Therefore we will manage the patient with IV antibiotics and monitor her inflammatory markers, fever, and pain--white count has trended down so far and fever improved after initial spike.   - Continue levofloxacin/metronidazole  - Repeat US on 3/16 is pending    Microcytic, hypochromic anemia  - Hb was 11.9 on admission and trending down to 8.2  - No sign of blood loss  - Probably the initial sample was hemoconcentrated (had prerenal DENNY at the time) and the trend down is closer to its actual value  - MCV 72 and MCHC 21.4, so probably iron deficiency  - Check iron panel and ferritin--could consider a dose of IV iron before discharge     Hypokalemia  - Replace per protocol    Acute kidney injury  - Patient's creatinine  was 1.7 on admission but trended down to 1.0 after fluids, suggestive of prerenal etiology.    Hidradenitis suppurativa  - Recently started on Humira, hold while inpatient    Thank you for the consult. We will continue to follow this patient.     Corwin Busch MD  Hospitalist Service  [facility]  ______________________________________________________________________    Interval History   Having some abdominal pain this morning, but no fevers overnight. Feels generally unwell but no acute new issues.    Data reviewed today: I reviewed all medications, new labs and imaging results over the last 24 hours.     Physical Exam   /73   Pulse 89   Temp 99.5  F (37.5  C) (Oral)   Resp 16   Wt 108.9 kg (240 lb)   SpO2 93%   BMI 38.74 kg/m    240 lbs 0 oz       General: Appears ill but not in acute distress.    HEENT: No scleral icterus. Oropharynx moist.     Neck: Supple. Normal range of motion.     Pulmonary: Normal work of breathing. Clear to auscultation bilaterally.    Cardiovascular: Regular rate and rhythm without murmur or extra heart sounds.    Abdomen: Soft and non-tender.    Extremities: No peripheral edema. No clubbing or cyanosis.     Neurologic: Awake, alert, appropriate.    Skin: Warm and dry.    Psychiatric: Normal affect and mood.     Data    Recent Labs   Lab 03/16/21  0926 03/15/21  1817 03/15/21  1212 03/15/21  0312 03/14/21  1831 03/14/21  0551   WBC 10.3  --   --  10.5  --  20.7*   HGB 8.2*  --   --  9.1*  --  11.9   MCV 72*  --   --  73*  --  73*     --   --  207  --  272   NA  --   --   --  138 136 132*   POTASSIUM 3.3* 3.6 3.3* 3.1*  --  3.0*   CHLORIDE  --   --   --  108  --  100   CO2  --   --   --  22  --  20   BUN  --   --   --  14  --  20   CR 0.81  --   --  1.05* 1.17* 1.70*   ANIONGAP  --   --   --  8  --  12   JOSEPH  --   --   --  7.9*  --  9.2   GLC  --   --   --  84  --  97   ALBUMIN  --   --   --   --   --  3.2*   PROTTOTAL  --   --   --   --   --  8.8    BILITOTAL  --   --   --   --   --  0.5   ALKPHOS  --   --   --   --   --  80   ALT  --   --   --   --   --  19   AST  --   --   --   --   --  16   LIPASE  --   --   --   --   --  53*     No results found for this or any previous visit (from the past 24 hour(s)).    Medications      Current Facility-Administered Medications   Medication Dose Route Frequency     hydrOXYzine  25 mg Oral At Bedtime     levofloxacin  500 mg Intravenous Q24H     metroNIDAZOLE  500 mg Intravenous Q12H     polyethylene glycol  17 g Oral Daily

## 2021-03-16 NOTE — PROVIDER NOTIFICATION
Provider notified of c/o SOB and expiratory wheeze; plan for Albuterol Inhaler PRN. Will monitor.

## 2021-03-16 NOTE — PLAN OF CARE
Vital Signs: Afebrile.   Pain/Comfort: Back pain controlled with Tylenol.   Assessment: Right lower quadrant tender. Scattered expiratory wheeze and SOB resolved with Albuterol Inhaler. K+ normalized.   Diet: Tolerated regular diet; good appetite.   Output: Loose stools; encouraged yogurt. Urinary frequency improving.    Activity/Ambulation: Up in room independently.   Social: S.O. present at the bedside this evening; supportive. Pt tearful when S.O. left this evening.   Plan: IVF. IV antibiotics. Pain control. Abdominal/Pelvic US tomorrow. Monitor and provide for needs.

## 2021-03-16 NOTE — PLAN OF CARE
Afebrile. Had one loose stool. Tolerated food from [employer] and crackers. Medicated with Oxycodone and Tylenol at the beginning of the shift for RLQ discomfort rated 2. Described it as occasionally sharp. Also  C/O tight  and sore back rated 3. Warm pack to back. Rested quietly the rest of the night. No wheeze noted in lungs tonight. O2 SATs in mid 90's at rest. AM labs to be drawn. Monitor pain.

## 2021-03-17 LAB
CREAT SERPL-MCNC: 0.67 MG/DL (ref 0.52–1.04)
ERYTHROCYTE [DISTWIDTH] IN BLOOD BY AUTOMATED COUNT: 16.2 % (ref 10–15)
GFR SERPL CREATININE-BSD FRML MDRD: >90 ML/MIN/{1.73_M2}
HCT VFR BLD AUTO: 29.4 % (ref 35–47)
HGB BLD-MCNC: 8.8 G/DL (ref 11.7–15.7)
MAGNESIUM SERPL-MCNC: 1.5 MG/DL (ref 1.6–2.3)
MCH RBC QN AUTO: 21.5 PG (ref 26.5–33)
MCHC RBC AUTO-ENTMCNC: 29.9 G/DL (ref 31.5–36.5)
MCV RBC AUTO: 72 FL (ref 78–100)
PLATELET # BLD AUTO: 335 10E9/L (ref 150–450)
POTASSIUM SERPL-SCNC: 3.4 MMOL/L (ref 3.4–5.3)
POTASSIUM SERPL-SCNC: 3.7 MMOL/L (ref 3.4–5.3)
RBC # BLD AUTO: 4.1 10E12/L (ref 3.8–5.2)
WBC # BLD AUTO: 9 10E9/L (ref 4–11)

## 2021-03-17 PROCEDURE — 36415 COLL VENOUS BLD VENIPUNCTURE: CPT | Performed by: OBSTETRICS & GYNECOLOGY

## 2021-03-17 PROCEDURE — 36415 COLL VENOUS BLD VENIPUNCTURE: CPT | Performed by: INTERNAL MEDICINE

## 2021-03-17 PROCEDURE — 99233 SBSQ HOSP IP/OBS HIGH 50: CPT | Performed by: INTERNAL MEDICINE

## 2021-03-17 PROCEDURE — 82565 ASSAY OF CREATININE: CPT | Performed by: INTERNAL MEDICINE

## 2021-03-17 PROCEDURE — 250N000013 HC RX MED GY IP 250 OP 250 PS 637: Performed by: OBSTETRICS & GYNECOLOGY

## 2021-03-17 PROCEDURE — 85027 COMPLETE CBC AUTOMATED: CPT | Performed by: INTERNAL MEDICINE

## 2021-03-17 PROCEDURE — 83735 ASSAY OF MAGNESIUM: CPT | Performed by: OBSTETRICS & GYNECOLOGY

## 2021-03-17 PROCEDURE — 120N000006 HC R&B PEDS

## 2021-03-17 PROCEDURE — 84132 ASSAY OF SERUM POTASSIUM: CPT | Performed by: INTERNAL MEDICINE

## 2021-03-17 PROCEDURE — 84132 ASSAY OF SERUM POTASSIUM: CPT | Performed by: OBSTETRICS & GYNECOLOGY

## 2021-03-17 PROCEDURE — 250N000011 HC RX IP 250 OP 636: Performed by: INTERNAL MEDICINE

## 2021-03-17 PROCEDURE — 250N000013 HC RX MED GY IP 250 OP 250 PS 637: Performed by: INTERNAL MEDICINE

## 2021-03-17 RX ORDER — IBUPROFEN 200 MG
200-400 TABLET ORAL EVERY 4 HOURS PRN
Status: DISCONTINUED | OUTPATIENT
Start: 2021-03-17 | End: 2021-03-20 | Stop reason: HOSPADM

## 2021-03-17 RX ORDER — SENNOSIDES 8.6 MG
1-2 TABLET ORAL 2 TIMES DAILY
Status: DISCONTINUED | OUTPATIENT
Start: 2021-03-17 | End: 2021-03-20 | Stop reason: HOSPADM

## 2021-03-17 RX ORDER — MAGNESIUM OXIDE 400 MG/1
400 TABLET ORAL 3 TIMES DAILY
Status: COMPLETED | OUTPATIENT
Start: 2021-03-17 | End: 2021-03-19

## 2021-03-17 RX ORDER — MAGNESIUM CARB/ALUMINUM HYDROX 105-160MG
296 TABLET,CHEWABLE ORAL
Status: DISCONTINUED | OUTPATIENT
Start: 2021-03-17 | End: 2021-03-20 | Stop reason: HOSPADM

## 2021-03-17 RX ORDER — POTASSIUM CHLORIDE 1500 MG/1
40 TABLET, EXTENDED RELEASE ORAL ONCE
Status: COMPLETED | OUTPATIENT
Start: 2021-03-17 | End: 2021-03-17

## 2021-03-17 RX ORDER — POLYETHYLENE GLYCOL 3350 17 G/17G
17 POWDER, FOR SOLUTION ORAL 2 TIMES DAILY
Status: DISCONTINUED | OUTPATIENT
Start: 2021-03-17 | End: 2021-03-20 | Stop reason: HOSPADM

## 2021-03-17 RX ADMIN — Medication 400 MG: at 19:55

## 2021-03-17 RX ADMIN — IBUPROFEN 400 MG: 200 TABLET, FILM COATED ORAL at 10:09

## 2021-03-17 RX ADMIN — POTASSIUM CHLORIDE 40 MEQ: 1500 TABLET, EXTENDED RELEASE ORAL at 08:37

## 2021-03-17 RX ADMIN — POLYETHYLENE GLYCOL 3350 17 G: 17 POWDER, FOR SOLUTION ORAL at 08:38

## 2021-03-17 RX ADMIN — Medication 1 MG: at 21:30

## 2021-03-17 RX ADMIN — IBUPROFEN 400 MG: 200 TABLET, FILM COATED ORAL at 15:35

## 2021-03-17 RX ADMIN — METRONIDAZOLE 500 MG: 500 INJECTION, SOLUTION INTRAVENOUS at 21:28

## 2021-03-17 RX ADMIN — POLYETHYLENE GLYCOL 3350 17 G: 17 POWDER, FOR SOLUTION ORAL at 20:01

## 2021-03-17 RX ADMIN — LEVOFLOXACIN 500 MG: 500 INJECTION, SOLUTION INTRAVENOUS at 12:06

## 2021-03-17 RX ADMIN — ACETAMINOPHEN 650 MG: 325 TABLET, FILM COATED ORAL at 23:21

## 2021-03-17 RX ADMIN — ACETAMINOPHEN 650 MG: 325 TABLET, FILM COATED ORAL at 08:37

## 2021-03-17 RX ADMIN — ACETAMINOPHEN 650 MG: 325 TABLET, FILM COATED ORAL at 15:35

## 2021-03-17 RX ADMIN — IBUPROFEN 400 MG: 200 TABLET, FILM COATED ORAL at 23:21

## 2021-03-17 RX ADMIN — METRONIDAZOLE 500 MG: 500 INJECTION, SOLUTION INTRAVENOUS at 10:09

## 2021-03-17 RX ADMIN — SENNOSIDES 1 TABLET: 8.6 TABLET, FILM COATED ORAL at 20:01

## 2021-03-17 RX ADMIN — HYDROXYZINE HYDROCHLORIDE 100 MG: 25 TABLET, FILM COATED ORAL at 04:24

## 2021-03-17 RX ADMIN — OXYCODONE HYDROCHLORIDE 10 MG: 5 TABLET ORAL at 20:01

## 2021-03-17 RX ADMIN — Medication 400 MG: at 15:36

## 2021-03-17 RX ADMIN — ACETAMINOPHEN 650 MG: 325 TABLET, FILM COATED ORAL at 04:24

## 2021-03-17 NOTE — PROGRESS NOTES
Hospital note    S: pt reports that she feels a bit better this morning, however, continues to have back pain. She would like to have a BM. Yesterday showed abscess possible with slight enlargement vs. Margin of error in measurement. IR feels that they are unable to drain the abscess.     O:  /71   Pulse 88   Temp 99.5  F (37.5  C) (Oral)   Resp 20   Wt 108.9 kg (240 lb)   SpO2 95%   BMI 38.74 kg/m    Abdomen: Mild tenderness and guarding without rebound, greater in the right than the left  Extremities: 1+ swelling no cords, tenderness erythema or edema.     Lab Results   Component Value Date    WBC 9.0 03/17/2021     Lab Results   Component Value Date    RBC 4.10 03/17/2021     Lab Results   Component Value Date    HGB 8.8 03/17/2021     Lab Results   Component Value Date    HCT 29.4 03/17/2021     No components found for: MCT  Lab Results   Component Value Date    MCV 72 03/17/2021     Lab Results   Component Value Date    MCH 21.5 03/17/2021     Lab Results   Component Value Date    MCHC 29.9 03/17/2021     Lab Results   Component Value Date    RDW 16.2 03/17/2021     Lab Results   Component Value Date     03/17/2021     A/P: HD#3 for pelvic abscess  1. Pt is afebrile for almost 48 hours. However, with interval possible increase in size I will plan to continue IV antibiotics for another 24 hours and re scan tomorrow morning. If stable or less will consider discharge home on oral medication. If increase in size will need to reconsider management plan.   2. Will start on bowel management as she notes long term struggle with constipation.   3. Await creatine this morning.   4. Appreciate hospitalist co-mangement.     Jayla Thompson MD

## 2021-03-17 NOTE — PROGRESS NOTES
[facility]  Hospitalist Progress Note  Chely Caceres MD 03/17/21  Text Page  Pager: [phone] (7am-6pm)    Reason for Stay (Diagnosis): pelvic abscess         Assessment and Plan:      Summary of Stay: Yoly Tipton is a 20 year old female who is 2.5 months status post elective termination of 13-week gestation pregnancy and hidradenitis suppurativa recently started on Humira who was admitted on 3/14/2021 with fever, chills and abdominal pain and was found to have abscess formation at the level of the cul-de-sac with fat stranding around the uterus.  Patient is admitted to the OB/GYN service and internal medicine is consulted for medical management.    Fever has now resolved but abscess slightly increasing on US on 3/16.  Continuing IV antibiotics with repeat US tomorrow to assess abscess.    Problem List/Assessment and Plan:     Pelvic abscess  Sepsis  - Patient met sepsis criteria initially (fever, leukocytosis, tachycardia)  - CT scan of the abdomen/pelvis as well as ultrasound showed 5.3 cm x 1.4 x 4.8 loculated hypoechoic collection in the cul-de-sac.   - She was started initially on cefotetan and doxycycline, then switched to metronidazole/levofloxacin, then ertapenem, now back to metronidazole/levofloxacin.  - Gonorrhea and Chlamydia PCR's are negative.  - OB/GYN spoke to interventional radiology who felt that the abscess was too small and deep to be accessed percutaneously.  Therefore we will manage the patient with IV antibiotics and monitor her inflammatory markers, fever, and pain  -  Fever and leukocytosis have now resolved  - Continue levofloxacin/metronidazole  - Repeat US on 3/16 showed slight increase in the size of the fluid collection  - Per OB/gyn will continue IV antibiotics today and repeat imaging tomorrow  - Pain control with Tylenol, add Ibuprofen today given normal renal function, Oxycodone PRN severe pain     Microcytic, hypochromic anemia  - Hb was 11.9 on admission  and trending down to 8.2, today has improved to 8.8  - No sign of blood loss  - Probably the initial sample was hemoconcentrated (had prerenal DENNY at the time) and the trend down is closer to its actual value  - MCV 72 and MCHC 21.4, ferritin is elevated (not surprising with infection), iron is within the normal range.  No indication for iron at this time  - Follow-up in the outpatient setting     Hypokalemia  - Replace per protocol  - Will add magnesium protocol as well     Acute kidney injury - Resolved  - Patient's creatinine was 1.7 on admission, has now normalized with IVF, today 0.67.     Hidradenitis suppurativa  - Recently started on Humira, hold while inpatient    Constipation  - Patient is having poor appetite due to constipation.  MiraLAX has been ordered.  I have also added senna and as needed magnesium citrate if she does not have a bowel movement by this afternoon.    Diet: Regular Diet Adult    DVT Prophylaxis: Pneumatic Compression Devices  Decker Catheter: not present  Code Status: Full Code      Disposition Plan   Expected discharge: defer to primary team, recommended to prior living arrangement once SIRS/Sepsis treated.  Entered: Chely Caceres MD 03/17/2021, 9:52 AM       The patient's care was discussed with the Bedside Nurse and Patient.    Hospitalist Service  [facility]          Interval History (Subjective):      Patient was seen with her bedside nurse this morning.  She states she is not eating great because she is constipated.  Discussed bowel regimen.  No nausea.  Adding Ibuprofen to help with pain as renal function improved as well.  She is tried.  Biggest concern today is constipation.                  Physical Exam:      Last Vital Signs:  /88   Pulse 98   Temp 99.4  F (37.4  C) (Oral)   Resp 12   Wt 108.9 kg (240 lb)   SpO2 96%   BMI 38.74 kg/m      General: Sleeping but will awaken and fully participate in conversation, appears fatigued  HEENT:  Normocephalic and atraumatic, eyes anicteric and without scleral injection, EOMI, face symmetric, MMM.  Cardiac: RRR, normal S1, S2. No m/g/r, no LE edema.  Pulmonary: Normal chest rise, normal work of breathing.  Lungs CTAB without crackles or wheezing.  Abdomen: soft, tender in lower abdomen, non-distended.  Normoactive bowel sounds, no guarding or rebound tenderness.  Extremities: no deformities.  Warm, well perfused.  Skin: no rashes or lesions.  Warm and Dry.  Neuro: No focal deficits.  Speech clear.  Coordination and strength grossly normal.  Psych: Alert and oriented x3. Appropriate affect.         Medications:      All current medications were reviewed with changes reflected in problem list.         Data:      All new lab and imaging data was reviewed.   Labs:  Recent Labs   Lab 03/17/21  0657 03/16/21  1527 03/16/21  0926 03/15/21  0312 03/15/21  0312 03/14/21  1831 03/14/21  0551   NA  --   --   --   --  138 136 132*   POTASSIUM 3.4 3.9 3.3*   < > 3.1*  --  3.0*   CHLORIDE  --   --   --   --  108  --  100   CO2  --   --   --   --  22  --  20   ANIONGAP  --   --   --   --  8  --  12   GLC  --   --   --   --  84  --  97   BUN  --   --   --   --  14  --  20   CR 0.67  --  0.81  --  1.05* 1.17* 1.70*   GFRESTIMATED >90  --  >90  --  76  --  42*   GFRESTBLACK >90  --  >90  --  88  --  49*   JOSEPH  --   --   --   --  7.9*  --  9.2    < > = values in this interval not displayed.     Recent Labs   Lab 03/17/21  0657 03/16/21  0926 03/15/21  0312   WBC 9.0 10.3 10.5   HGB 8.8* 8.2* 9.1*   HCT 29.4* 27.7* 30.0*   MCV 72* 72* 73*    247 207      Imaging:   Recent Results (from the past 24 hour(s))   US Pelvic Limited    Narrative    PELVIC ULTRASOUND WITH ENDOVAGINAL TRANSDUCER    3/16/2021 3:45 PM     HISTORY: Reassess size of pelvic abscess.    TECHNIQUE:  Endovaginal sonography was added to the transabdominal  exam.    COMPARISON: CT abdomen and pelvis on 3/15/2021.    FINDINGS:   Endometrium: Endometrium is  6 mm thick.    Uterus: Measures 10.5 x 4.8 x 4.0 cm. No uterine fibroids.    Right ovary: Measures 3.3 x 2.3 x 2.2 cm. It demonstrates normal  follicular structure and color-flow. There is a complex anechoic  tubular area that appears to extend from the the right ovary/adnexa to  the cul-de-sac near the complex fluid collection, measuring  approximately 9.1 x 4.4 cm.    Left ovary: Measures 4.4 x 4.1 x 3.5 cm. It demonstrates normal  follicular structure and color-flow. There is 2.7 x 2.6 x 2.3 cm  anechoic cyst in the left ovary, likely physiological follicle.    Additional findings: There is approximately 6.5 x 6.5 x 3.1 cm complex  hypoechoic collection in the cul-de-sac, slightly increased in size as  compared to 3/15/2021 exam, where it measured 4.9 x 2.9 x 5.7 cm.      Impression    IMPRESSION:    1. Slight interval increase in the size of the pelvic fluid collection  currently measures 6.5 x 6.5 x 3.1 cm, previously measured 4.9 x 2.9 x  5.7 cm on 3/15/2021 exam.  2. Mildly complex tubular anechoic area extending from the right  adnexa to the cul-de-sac, indeterminate, could represent dilated  fallopian tube versus mildly loculated pelvic fluid.    MD Chely GALAVIZ MD

## 2021-03-17 NOTE — PLAN OF CARE
Appeared comfortable and sleeping until 0415. Woke to try to have a BM and was not able to. C/O lower abdominal pain rated 7-8 along with back pain. Appeared anxious. Was restless and breathing 28. HR 95 to 110. Atarax given along with Tylenol. Had a slightly mucous green hued stool. Then c/o her collar bones hurting saying it hurt to breath because of the lower abdominal pain. Cold packs applied. At 0530 Stated she felt better and in less pain. Was able to fall asleep. Temperature max 99.5 oral. Tolerated sips of water. She removed the hat from toilet, again reminded we need to measure it. Next time void 100 ml clear yellow urine. IV is saline locked. Will have AM labs drawn. Encourage fluids as tolerated. Monitor bowel function.

## 2021-03-17 NOTE — PLAN OF CARE
Vital Signs: VSS. Afebrile.   Pain/Comfort: Pain well managed with tylenol and motrin.  Assessment: WDL.  Diet: Had small amount to eat, crackers and juice.  Output: Voiding well. Had 3 loose stools, pt reports feeling much better.  Activity/Ambulation: Up in room independently.

## 2021-03-17 NOTE — PROGRESS NOTES
"Vital Signs: WNL. Patient afebrile for RN.   Pain/Comfort: patient rating pain as a 3-5/10. Per patient pain is mostly \"constipation\" pain. Prune juice provided.   Assessment: PIV site c/d/i and flushed. Abdomen soft but tender to palpation.   Diet: Patient tolerating PO intake.   Output: patient with + voids. Patient encouraged to drink. Patient stated an understanding.   Activity/Ambulation: patient ambulated in hallway x1 for RN. Patient up independently in room. Ambulation encouraged.   Social: patient calm and cooperative. All questions answered.   Plan: Pain control. Maintain PIV. IVF ABX. Monitor I&O. Encourage ambulation.   "

## 2021-03-18 ENCOUNTER — APPOINTMENT (OUTPATIENT)
Dept: ULTRASOUND IMAGING | Facility: CLINIC | Age: 21
DRG: 757 | End: 2021-03-18
Attending: OBSTETRICS & GYNECOLOGY
Payer: COMMERCIAL

## 2021-03-18 ENCOUNTER — APPOINTMENT (OUTPATIENT)
Dept: CT IMAGING | Facility: CLINIC | Age: 21
DRG: 757 | End: 2021-03-18
Attending: OBSTETRICS & GYNECOLOGY
Payer: COMMERCIAL

## 2021-03-18 LAB
ALBUMIN SERPL-MCNC: 1.9 G/DL (ref 3.4–5)
ALP SERPL-CCNC: 53 U/L (ref 40–150)
ALT SERPL W P-5'-P-CCNC: 13 U/L (ref 0–50)
ANION GAP SERPL CALCULATED.3IONS-SCNC: 7 MMOL/L (ref 3–14)
AST SERPL W P-5'-P-CCNC: 9 U/L (ref 0–45)
BASOPHILS # BLD AUTO: 0 10E9/L (ref 0–0.2)
BASOPHILS NFR BLD AUTO: 0.2 %
BILIRUB SERPL-MCNC: 0.3 MG/DL (ref 0.2–1.3)
BUN SERPL-MCNC: 3 MG/DL (ref 7–30)
CALCIUM SERPL-MCNC: 8 MG/DL (ref 8.5–10.1)
CHLORIDE SERPL-SCNC: 108 MMOL/L (ref 94–109)
CO2 SERPL-SCNC: 26 MMOL/L (ref 20–32)
CREAT SERPL-MCNC: 0.68 MG/DL (ref 0.52–1.04)
DIFFERENTIAL METHOD BLD: ABNORMAL
EOSINOPHIL # BLD AUTO: 0.1 10E9/L (ref 0–0.7)
EOSINOPHIL NFR BLD AUTO: 0.9 %
ERYTHROCYTE [DISTWIDTH] IN BLOOD BY AUTOMATED COUNT: 16.4 % (ref 10–15)
GFR SERPL CREATININE-BSD FRML MDRD: >90 ML/MIN/{1.73_M2}
GLUCOSE SERPL-MCNC: 90 MG/DL (ref 70–99)
HCT VFR BLD AUTO: 29.6 % (ref 35–47)
HGB BLD-MCNC: 9 G/DL (ref 11.7–15.7)
IMM GRANULOCYTES # BLD: 0.2 10E9/L (ref 0–0.4)
IMM GRANULOCYTES NFR BLD: 1.5 %
LYMPHOCYTES # BLD AUTO: 1.6 10E9/L (ref 0.8–5.3)
LYMPHOCYTES NFR BLD AUTO: 10.5 %
MAGNESIUM SERPL-MCNC: 1.7 MG/DL (ref 1.6–2.3)
MCH RBC QN AUTO: 22.1 PG (ref 26.5–33)
MCHC RBC AUTO-ENTMCNC: 30.4 G/DL (ref 31.5–36.5)
MCV RBC AUTO: 73 FL (ref 78–100)
MONOCYTES # BLD AUTO: 1 10E9/L (ref 0–1.3)
MONOCYTES NFR BLD AUTO: 6.9 %
NEUTROPHILS # BLD AUTO: 11.9 10E9/L (ref 1.6–8.3)
NEUTROPHILS NFR BLD AUTO: 80 %
NRBC # BLD AUTO: 0 10*3/UL
NRBC BLD AUTO-RTO: 0 /100
PLATELET # BLD AUTO: 374 10E9/L (ref 150–450)
POTASSIUM SERPL-SCNC: 3.5 MMOL/L (ref 3.4–5.3)
PROT SERPL-MCNC: 5.8 G/DL (ref 6.8–8.8)
RBC # BLD AUTO: 4.08 10E12/L (ref 3.8–5.2)
SODIUM SERPL-SCNC: 141 MMOL/L (ref 133–144)
WBC # BLD AUTO: 14.8 10E9/L (ref 4–11)

## 2021-03-18 PROCEDURE — 99232 SBSQ HOSP IP/OBS MODERATE 35: CPT | Mod: GC | Performed by: INTERNAL MEDICINE

## 2021-03-18 PROCEDURE — 83735 ASSAY OF MAGNESIUM: CPT | Performed by: OBSTETRICS & GYNECOLOGY

## 2021-03-18 PROCEDURE — 85025 COMPLETE CBC W/AUTO DIFF WBC: CPT | Performed by: OBSTETRICS & GYNECOLOGY

## 2021-03-18 PROCEDURE — 250N000013 HC RX MED GY IP 250 OP 250 PS 637: Performed by: INTERNAL MEDICINE

## 2021-03-18 PROCEDURE — 250N000013 HC RX MED GY IP 250 OP 250 PS 637: Performed by: OBSTETRICS & GYNECOLOGY

## 2021-03-18 PROCEDURE — 36415 COLL VENOUS BLD VENIPUNCTURE: CPT | Performed by: OBSTETRICS & GYNECOLOGY

## 2021-03-18 PROCEDURE — 120N000006 HC R&B PEDS

## 2021-03-18 PROCEDURE — 76830 TRANSVAGINAL US NON-OB: CPT

## 2021-03-18 PROCEDURE — 80053 COMPREHEN METABOLIC PANEL: CPT | Performed by: OBSTETRICS & GYNECOLOGY

## 2021-03-18 PROCEDURE — 250N000011 HC RX IP 250 OP 636: Performed by: INTERNAL MEDICINE

## 2021-03-18 PROCEDURE — 250N000009 HC RX 250: Performed by: OBSTETRICS & GYNECOLOGY

## 2021-03-18 PROCEDURE — 250N000011 HC RX IP 250 OP 636: Performed by: OBSTETRICS & GYNECOLOGY

## 2021-03-18 PROCEDURE — 74177 CT ABD & PELVIS W/CONTRAST: CPT

## 2021-03-18 RX ORDER — IOPAMIDOL 755 MG/ML
500 INJECTION, SOLUTION INTRAVASCULAR ONCE
Status: COMPLETED | OUTPATIENT
Start: 2021-03-18 | End: 2021-03-18

## 2021-03-18 RX ADMIN — LEVOFLOXACIN 500 MG: 500 INJECTION, SOLUTION INTRAVENOUS at 11:50

## 2021-03-18 RX ADMIN — OXYCODONE HYDROCHLORIDE 10 MG: 5 TABLET ORAL at 13:09

## 2021-03-18 RX ADMIN — SENNOSIDES 2 TABLET: 8.6 TABLET, FILM COATED ORAL at 20:56

## 2021-03-18 RX ADMIN — ACETAMINOPHEN 650 MG: 325 TABLET, FILM COATED ORAL at 16:03

## 2021-03-18 RX ADMIN — Medication 400 MG: at 14:28

## 2021-03-18 RX ADMIN — METRONIDAZOLE 500 MG: 500 INJECTION, SOLUTION INTRAVENOUS at 10:31

## 2021-03-18 RX ADMIN — IBUPROFEN 400 MG: 200 TABLET, FILM COATED ORAL at 06:57

## 2021-03-18 RX ADMIN — POLYETHYLENE GLYCOL 3350 17 G: 17 POWDER, FOR SOLUTION ORAL at 10:31

## 2021-03-18 RX ADMIN — SENNOSIDES 2 TABLET: 8.6 TABLET, FILM COATED ORAL at 10:30

## 2021-03-18 RX ADMIN — IOPAMIDOL 100 ML: 755 INJECTION, SOLUTION INTRAVENOUS at 13:26

## 2021-03-18 RX ADMIN — METRONIDAZOLE 500 MG: 500 INJECTION, SOLUTION INTRAVENOUS at 22:01

## 2021-03-18 RX ADMIN — Medication 400 MG: at 20:57

## 2021-03-18 RX ADMIN — ACETAMINOPHEN 650 MG: 325 TABLET, FILM COATED ORAL at 06:57

## 2021-03-18 RX ADMIN — IBUPROFEN 400 MG: 200 TABLET, FILM COATED ORAL at 20:56

## 2021-03-18 RX ADMIN — Medication 1 MG: at 22:01

## 2021-03-18 RX ADMIN — POLYETHYLENE GLYCOL 3350 17 G: 17 POWDER, FOR SOLUTION ORAL at 20:56

## 2021-03-18 RX ADMIN — SODIUM CHLORIDE 65 ML: 9 INJECTION, SOLUTION INTRAVENOUS at 13:26

## 2021-03-18 RX ADMIN — Medication 400 MG: at 10:30

## 2021-03-18 RX ADMIN — HYDROXYZINE HYDROCHLORIDE 100 MG: 25 TABLET, FILM COATED ORAL at 20:57

## 2021-03-18 NOTE — PLAN OF CARE
Vital Signs: Afebrile.   Pain/Comfort: Received Oxycodone for c/o RLQ abdominal pain; will monitor effectiveness.   Assessment: Abdomen tender in RLQ. Hypoactive bowel sounds. Pale. Mild edema in bilateral hands.   Diet: Tolerated regular diet this morning. NPO now.   Output: Voids times two.   Activity/Ambulation: Ambulated in room idependently.   Social: No visitors today. Pleasant with RN.   Plan: IV antibiotics. Pain control. Encourage fluids and ambulation. US and CT results pending.

## 2021-03-18 NOTE — PROGRESS NOTES
[facility]  Hospitalist Progress Note  Chely Caceres MD 03/18/21   Text Page  Pager: [phone] (7am-6pm)    Reason for Stay (Diagnosis): pelvic abscess         Assessment and Plan:      Summary of Stay: Yoly Tipton is a 20 year old female who is 2.5 months status post elective termination of 13-week gestation pregnancy and hidradenitis suppurativa recently started on Humira who was admitted on 3/14/2021 with fever, chills and abdominal pain and was found to have abscess formation at the level of the cul-de-sac with fat stranding around the uterus.  Patient is admitted to the OB/GYN service and internal medicine is consulted for medical management.    Fever has now resolved but abscess slightly increasing on US on 3/16.  Continuing IV antibiotics with repeat US today to assess abscess.    Problem List/Assessment and Plan:     Pelvic abscess  Sepsis  - Patient met sepsis criteria initially (fever, leukocytosis, tachycardia)  - CT scan of the abdomen/pelvis as well as ultrasound showed 5.3 cm x 1.4 x 4.8 loculated hypoechoic collection in the cul-de-sac.   - She was started initially on cefotetan and doxycycline, then switched to metronidazole/levofloxacin, then ertapenem, now back to metronidazole/levofloxacin.  - Gonorrhea and Chlamydia PCR's are negative.  - OB/GYN spoke to interventional radiology who felt that the abscess was too small and deep to be accessed percutaneously.  Therefore we will manage the patient with IV antibiotics and monitor her inflammatory markers, fever, and pain  -  Fever has resolved but leukocytosis returned today after normalization yesterday  - Continue levofloxacin/metronidazole  - Repeat US on 3/16 showed slight increase in the size of the fluid collection; repeat US today with results pending  - Pain control with Tylenol, Ibuprofen, Oxycodone PRN severe pain     Microcytic, hypochromic anemia  - Hb was 11.9 on admission and trending down to 8.2, today has  improved to 9  - No sign of blood loss  - Probably the initial sample was hemoconcentrated (had prerenal DENNY at the time) and the trend down is closer to its actual value  - MCV 72 and MCHC 21.4, ferritin is elevated (not surprising with infection), iron is within the normal range.  No indication for iron at this time  - Follow-up in the outpatient setting     Hypokalemia  - Replace per protocol  - Will add magnesium protocol as well     Acute kidney injury - Resolved  - Patient's creatinine was 1.7 on admission, has now normalized with IVF, today 0.67.     Hidradenitis suppurativa  - Recently started on Humira, hold while inpatient    Constipation  - Patient is having poor appetite due to constipation.  Bowel regimen ordered.  She has now had several soft stools.    Diet: Regular Diet Adult    DVT Prophylaxis: Pneumatic Compression Devices  Decker Catheter: not present  Code Status: Full Code      Disposition Plan   Expected discharge: defer to primary team, recommended to prior living arrangement once SIRS/Sepsis treated.  Entered: Chely Caceres MD 03/18/2021, 9:27 AM       The patient's care was discussed with the Bedside Nurse, Patient and Primary team.    Hospitalist Service  [facility]          Interval History (Subjective):      Patient was seen with the OB/Gyn provider this morning.  The patient states that she still is having lower abdominal pressure type pain.  The ibuprofen does help to decrease this.  She did have several loose bowel movements yesterday after bowel regimen was started.  No nausea or vomiting.  No chest pain or shortness of breath.  She is urinating.                  Physical Exam:      Last Vital Signs:  /73   Pulse 80   Temp 98.4  F (36.9  C) (Oral)   Resp 16   Wt 108.9 kg (240 lb)   SpO2 95%   BMI 38.74 kg/m      General: Awake, alert, no acute distress but does appear fatigued  HEENT: Normocephalic and atraumatic, eyes anicteric and without scleral  injection, EOMI, face symmetric, MMM.  Cardiac: RRR, normal S1, S2. No m/g/r, no LE edema.  Pulmonary: Normal chest rise, normal work of breathing.  Lungs CTAB without crackles or wheezing.  Abdomen: soft, tender in lower abdomen, non-distended.  Normoactive bowel sounds, no guarding or rebound tenderness.  Extremities: no deformities.  Warm, well perfused.  Skin: no rashes or lesions.  Warm and Dry.  Neuro: No focal deficits.  Speech clear.  Coordination and strength grossly normal.  Psych: Alert and oriented x3. Appropriate affect.         Medications:      All current medications were reviewed with changes reflected in problem list.         Data:      All new lab and imaging data was reviewed.   Labs:  Recent Labs   Lab 03/18/21  0642 03/17/21  1426 03/17/21  0657 03/16/21  0926 03/16/21  0926 03/15/21  0312 03/15/21  0312 03/14/21  1831 03/14/21  0551     --   --   --   --   --  138 136 132*   POTASSIUM 3.5 3.7 3.4   < > 3.3*   < > 3.1*  --  3.0*   CHLORIDE 108  --   --   --   --   --  108  --  100   CO2 26  --   --   --   --   --  22  --  20   ANIONGAP 7  --   --   --   --   --  8  --  12   GLC 90  --   --   --   --   --  84  --  97   BUN 3*  --   --   --   --   --  14  --  20   CR 0.68  --  0.67  --  0.81  --  1.05* 1.17* 1.70*   GFRESTIMATED >90  --  >90  --  >90  --  76  --  42*   GFRESTBLACK >90  --  >90  --  >90  --  88  --  49*   JOSEPH 8.0*  --   --   --   --   --  7.9*  --  9.2    < > = values in this interval not displayed.     Recent Labs   Lab 03/18/21  0642 03/17/21  0657 03/16/21  0926   WBC 14.8* 9.0 10.3   HGB 9.0* 8.8* 8.2*   HCT 29.6* 29.4* 27.7*   MCV 73* 72* 72*    335 247      Imaging:   No results found for this or any previous visit (from the past 24 hour(s)).    Chely Caceres MD

## 2021-03-18 NOTE — PLAN OF CARE
Vital Signs:stable   Pain/Comfort: rating pain 2/10. Pain controlled with Ibuprofen and Tylenol.   Diet: Regular diet   Activity/Ambulation: Asleep most of the night. Up to bathroom.   Plan: Monitor pain and IV antibiotics.

## 2021-03-18 NOTE — PROGRESS NOTES
March 18, 2021      Preliminary pelvic US results showed what appears to be increase in size of fluid collection in posterior cul de sac and BL adnexa.  Pt did have an increase in WBC from 9> 14.8 today, although still remains afebrile. Called IR today to see if drainage would now be feasible given increase in size.  Spoke to Dr. Sweetie Peters, who recommend repeat CT A/P with IV contrast.  Since pt has not been NPO today, will make NPO p midnight and they will f/u on CT and reassess for possible drainage.      VAN PERERA MD

## 2021-03-18 NOTE — PLAN OF CARE
BP (!) 140/75 (BP Location: Left arm)   Pulse 97   Temp 98.4  F (36.9  C) (Oral)   Resp 16   Wt 108.9 kg (240 lb)   SpO2 95%   BMI 38.74 kg/m      Pt is A&O, LS clear on RA. Given for tylenol for pain in RLQ ABD. BS hypoactive. Mild edema to hands. Tolerating reg diet. NPO @ midnight. Voiding without difficulty. Up IND. Calls appropriately. Plan IV ABX, pain management, fluids and ambulation. Potential drain of abscess tomorrow in IR

## 2021-03-18 NOTE — PROGRESS NOTES
"March 18, 2021      S: No acute overnight events.  Pt states she feels \"better than the previous 2 days\" although still has some pelvic pain and her back feels \"tight.\"  She had 3 loose stools yesterday.  Denies n/v, tolerating some PO.  Ambulated yesterday without difficulty.      O: /61   Pulse 90   Temp 98.4  F (36.9  C) (Oral)   Resp 16   Wt 108.9 kg (240 lb)   SpO2 96%   BMI 38.74 kg/m    Gen: NAD, A&O x 3  Abd: obese, soft, NTND, no rebound, no guarding  Ext: minimal edema BL LEs, No CT      Lab Results   Component Value Date    WBC 14.8 03/18/2021     Lab Results   Component Value Date    RBC 4.08 03/18/2021     Lab Results   Component Value Date    HGB 9.0 03/18/2021     Lab Results   Component Value Date    HCT 29.6 03/18/2021     No components found for: MCT  Lab Results   Component Value Date    MCV 73 03/18/2021     Lab Results   Component Value Date    MCH 22.1 03/18/2021     Lab Results   Component Value Date    MCHC 30.4 03/18/2021     Lab Results   Component Value Date    RDW 16.4 03/18/2021     Lab Results   Component Value Date     03/18/2021     Last Comprehensive Metabolic Panel:  Sodium   Date Value Ref Range Status   03/18/2021 141 133 - 144 mmol/L Final     Potassium   Date Value Ref Range Status   03/18/2021 3.5 3.4 - 5.3 mmol/L Final     Chloride   Date Value Ref Range Status   03/18/2021 108 94 - 109 mmol/L Final     Carbon Dioxide   Date Value Ref Range Status   03/18/2021 26 20 - 32 mmol/L Final     Anion Gap   Date Value Ref Range Status   03/18/2021 7 3 - 14 mmol/L Final     Glucose   Date Value Ref Range Status   03/18/2021 90 70 - 99 mg/dL Final     Urea Nitrogen   Date Value Ref Range Status   03/18/2021 3 (L) 7 - 30 mg/dL Final     Creatinine   Date Value Ref Range Status   03/18/2021 0.68 0.52 - 1.04 mg/dL Final     GFR Estimate   Date Value Ref Range Status   03/18/2021 >90 >60 mL/min/[1.73_m2] Final     Comment:     Non  GFR Calc  Starting " 2018, serum creatinine based estimated GFR (eGFR) will be   calculated using the Chronic Kidney Disease Epidemiology Collaboration   (CKD-EPI) equation.       Calcium   Date Value Ref Range Status   2021 8.0 (L) 8.5 - 10.1 mg/dL Final     Blood cultures: negative to date  GC/CG: neg  Wet prep: neg      A/P: 19yo  HD#4 admitted for pelvic abscess. Afebrile, VSS.  Pt has been afebrile since morning of 3/15, Tmax 99.5 yesterday AM.  However, WBC did increase (20.7>10.5>10.3>9.0>14.8 this AM).  Has a repeat US ordered today to assess size of fluid collection.  If increasing in size, will touch base with IR again to see if able to be drained.  If not, may need laparoscopic wash out.  However, if stable/decreasing in size, then may be able to avoid procedure and continue with antibiotics.  Pt understands and agrees with plan of care, all questions answered      VAN PERERA MD.

## 2021-03-19 ENCOUNTER — APPOINTMENT (OUTPATIENT)
Dept: CT IMAGING | Facility: CLINIC | Age: 21
DRG: 757 | End: 2021-03-19
Attending: RADIOLOGY
Payer: COMMERCIAL

## 2021-03-19 LAB
ERYTHROCYTE [DISTWIDTH] IN BLOOD BY AUTOMATED COUNT: 16.7 % (ref 10–15)
GRAM STN SPEC: NORMAL
GRAM STN SPEC: NORMAL
HCT VFR BLD AUTO: 30.4 % (ref 35–47)
HGB BLD-MCNC: 9.2 G/DL (ref 11.7–15.7)
MCH RBC QN AUTO: 22 PG (ref 26.5–33)
MCHC RBC AUTO-ENTMCNC: 30.3 G/DL (ref 31.5–36.5)
MCV RBC AUTO: 73 FL (ref 78–100)
PLATELET # BLD AUTO: 411 10E9/L (ref 150–450)
RBC # BLD AUTO: 4.19 10E12/L (ref 3.8–5.2)
SPECIMEN SOURCE: NORMAL
WBC # BLD AUTO: 12 10E9/L (ref 4–11)

## 2021-03-19 PROCEDURE — 250N000013 HC RX MED GY IP 250 OP 250 PS 637: Performed by: OBSTETRICS & GYNECOLOGY

## 2021-03-19 PROCEDURE — 272N000431 CT ABDOMEN PERITONEUM ABSCESS DRAIN W CATH PLACE

## 2021-03-19 PROCEDURE — 0W9J30Z DRAINAGE OF PELVIC CAVITY WITH DRAINAGE DEVICE, PERCUTANEOUS APPROACH: ICD-10-PCS | Performed by: RADIOLOGY

## 2021-03-19 PROCEDURE — 250N000011 HC RX IP 250 OP 636: Performed by: RADIOLOGY

## 2021-03-19 PROCEDURE — 87205 SMEAR GRAM STAIN: CPT | Performed by: OBSTETRICS & GYNECOLOGY

## 2021-03-19 PROCEDURE — 250N000011 HC RX IP 250 OP 636: Performed by: INTERNAL MEDICINE

## 2021-03-19 PROCEDURE — 87075 CULTR BACTERIA EXCEPT BLOOD: CPT | Performed by: OBSTETRICS & GYNECOLOGY

## 2021-03-19 PROCEDURE — 120N000006 HC R&B PEDS

## 2021-03-19 PROCEDURE — 250N000013 HC RX MED GY IP 250 OP 250 PS 637: Performed by: INTERNAL MEDICINE

## 2021-03-19 PROCEDURE — 250N000011 HC RX IP 250 OP 636

## 2021-03-19 PROCEDURE — 85027 COMPLETE CBC AUTOMATED: CPT | Performed by: INTERNAL MEDICINE

## 2021-03-19 PROCEDURE — 99232 SBSQ HOSP IP/OBS MODERATE 35: CPT | Mod: GC | Performed by: INTERNAL MEDICINE

## 2021-03-19 PROCEDURE — 87070 CULTURE OTHR SPECIMN AEROBIC: CPT | Performed by: OBSTETRICS & GYNECOLOGY

## 2021-03-19 PROCEDURE — 36415 COLL VENOUS BLD VENIPUNCTURE: CPT | Performed by: INTERNAL MEDICINE

## 2021-03-19 RX ORDER — FENTANYL CITRATE 50 UG/ML
INJECTION, SOLUTION INTRAMUSCULAR; INTRAVENOUS
Status: COMPLETED
Start: 2021-03-19 | End: 2021-03-19

## 2021-03-19 RX ORDER — FENTANYL CITRATE 50 UG/ML
25-50 INJECTION, SOLUTION INTRAMUSCULAR; INTRAVENOUS
Status: DISCONTINUED | OUTPATIENT
Start: 2021-03-19 | End: 2021-03-19

## 2021-03-19 RX ORDER — LIDOCAINE HYDROCHLORIDE 10 MG/ML
20 INJECTION, SOLUTION EPIDURAL; INFILTRATION; INTRACAUDAL; PERINEURAL ONCE
Status: DISCONTINUED | OUTPATIENT
Start: 2021-03-19 | End: 2021-03-20 | Stop reason: HOSPADM

## 2021-03-19 RX ADMIN — MIDAZOLAM 1 MG: 1 INJECTION INTRAMUSCULAR; INTRAVENOUS at 12:04

## 2021-03-19 RX ADMIN — FENTANYL CITRATE 50 MCG: 50 INJECTION, SOLUTION INTRAMUSCULAR; INTRAVENOUS at 11:56

## 2021-03-19 RX ADMIN — Medication 1 MG: at 22:05

## 2021-03-19 RX ADMIN — FENTANYL CITRATE 50 MCG: 50 INJECTION, SOLUTION INTRAMUSCULAR; INTRAVENOUS at 12:04

## 2021-03-19 RX ADMIN — SENNOSIDES 2 TABLET: 8.6 TABLET, FILM COATED ORAL at 08:16

## 2021-03-19 RX ADMIN — IBUPROFEN 400 MG: 200 TABLET, FILM COATED ORAL at 08:16

## 2021-03-19 RX ADMIN — POLYETHYLENE GLYCOL 3350 17 G: 17 POWDER, FOR SOLUTION ORAL at 20:29

## 2021-03-19 RX ADMIN — POLYETHYLENE GLYCOL 3350 17 G: 17 POWDER, FOR SOLUTION ORAL at 08:13

## 2021-03-19 RX ADMIN — LEVOFLOXACIN 500 MG: 500 INJECTION, SOLUTION INTRAVENOUS at 14:13

## 2021-03-19 RX ADMIN — HYDROXYZINE HYDROCHLORIDE 100 MG: 25 TABLET, FILM COATED ORAL at 14:13

## 2021-03-19 RX ADMIN — METRONIDAZOLE 500 MG: 500 INJECTION, SOLUTION INTRAVENOUS at 10:43

## 2021-03-19 RX ADMIN — SENNOSIDES 2 TABLET: 8.6 TABLET, FILM COATED ORAL at 20:29

## 2021-03-19 RX ADMIN — HYDROXYZINE HYDROCHLORIDE 100 MG: 25 TABLET, FILM COATED ORAL at 08:16

## 2021-03-19 RX ADMIN — METRONIDAZOLE 500 MG: 500 INJECTION, SOLUTION INTRAVENOUS at 22:05

## 2021-03-19 RX ADMIN — IBUPROFEN 400 MG: 200 TABLET, FILM COATED ORAL at 14:12

## 2021-03-19 RX ADMIN — Medication 400 MG: at 08:16

## 2021-03-19 RX ADMIN — MIDAZOLAM 2 MG: 1 INJECTION INTRAMUSCULAR; INTRAVENOUS at 11:56

## 2021-03-19 RX ADMIN — HYDROXYZINE HYDROCHLORIDE 100 MG: 25 TABLET, FILM COATED ORAL at 20:29

## 2021-03-19 RX ADMIN — FENTANYL CITRATE 50 MCG: 50 INJECTION, SOLUTION INTRAMUSCULAR; INTRAVENOUS at 12:23

## 2021-03-19 RX ADMIN — FENTANYL CITRATE 50 MCG: 50 INJECTION, SOLUTION INTRAMUSCULAR; INTRAVENOUS at 12:10

## 2021-03-19 RX ADMIN — MIDAZOLAM 1 MG: 1 INJECTION INTRAMUSCULAR; INTRAVENOUS at 12:11

## 2021-03-19 RX ADMIN — ALBUTEROL SULFATE 2 PUFF: 90 AEROSOL, METERED RESPIRATORY (INHALATION) at 18:39

## 2021-03-19 RX ADMIN — IBUPROFEN 400 MG: 200 TABLET, FILM COATED ORAL at 20:29

## 2021-03-19 RX ADMIN — ACETAMINOPHEN 650 MG: 325 TABLET, FILM COATED ORAL at 10:53

## 2021-03-19 NOTE — PROGRESS NOTES
Patient impatient, with persistent pain. Would like to make an intervention plan and move forward. NPO since midnight, no nausea now. RLQ pain minimally improved.     Temp: 99.4  F (37.4  C) Temp  Min: 97.8  F (36.6  C)  Max: 99.4  F (37.4  C)    No data recorded  BP: 135/76 Systolic (24hrs), Av , Min:120 , Max:140   Diastolic (24hrs), Av, Min:55, Max:76    Gen--lying in bed, NAD  Abd--obese, soft, moderately tender in RLQ with referred pain when exam.     Recent Labs   Lab 21  0651 21  0642 21  0657   WBC 12.0* 14.8* 9.0   HGB 9.2* 9.0* 8.8*   HCT 30.4* 29.6* 29.4*   MCV 73* 73* 72*    374 335     Plan to either have IR attempt drain placement or move ahead with laparoscopic pelvic washout. Patient would prefer drain placement, will update with plan.     Vannesa Botello MD

## 2021-03-19 NOTE — IR NOTE
CT ABDOMEN PERITONEUM ABSCESS DRAIN W CATH PLACE  3/19/2021 12:59 PM     HISTORY:  20-year-old female with a pelvic abscess felt to be secondary to pelvic inflammatory disease.    COMPARISON: CT scan of the abdomen pelvis dated 3/18/2021    FINDINGS: After obtaining informed consent, the patient was placed in a prone position on the CT table. The right gluteal region was prepped and draped in the usual sterile manner. 1% lidocaine was injected for local anesthesia. Under CT guidance, using a blunt Trejo needle and subsequently a 5 Bruneian Yueh needle, access into a pelvic abscess cavity was obtained. A wire was curled in the abscess cavity. Over the wire, a 10 [place]ian drain was placed. Approximately 15 cc of yellow turbid fluid was aspirated out. A follow-up CT scan showed that the drain had broken out from the left anterior aspects of the abscess cavity with pigtail curled in the left lower abdomen. The pigtail was unlocked and the drain was pulled back into the pelvic abscess cavity. At this level, 20 cc of purulent fluid was able to be aspirated out. The catheter was sutured to the patient's skin and hooked to a bulb for external drainage.    I determined this patient to be an appropriate candidate for the planned sedation and procedure and reassessed the patient immediately prior to sedation and procedure. Moderate intravenous conscious sedation was supervised by me. The patient was independently monitored by a registered nurse assigned to the Department of radiology using automated blood pressure, EKG and pulse oximetry. The patient tolerated the procedure well. There were no immediate postprocedure complications. The patient's vital signs were monitored by radiology nursing staff under my supervision and remained stable throughout the study. Radiation dose for this scan was reduced using automated exposure control, adjustment of the mA and/or kV according to patient size, or iterative reconstruction  technique.    MEDICATIONS: 4 mg Versed, 200 mcg fentanyl    Sedation time: 40 minutes    IMPRESSION: Drain placed into a pelvic abscess as described above.

## 2021-03-19 NOTE — PROGRESS NOTES
[facility]  Hospitalist Progress Note  Chely Caceres MD 03/19/21   Text Page  Pager: [phone] (7am-6pm)    Reason for Stay (Diagnosis): pelvic abscess         Assessment and Plan:      Summary of Stay: Yoly Tipton is a 20 year old female who is 2.5 months status post elective termination of 13-week gestation pregnancy and hidradenitis suppurativa recently started on Humira who was admitted on 3/14/2021 with fever, chills and abdominal pain and was found to have abscess formation at the level of the cul-de-sac with fat stranding around the uterus.  Patient is admitted to the OB/GYN service and internal medicine is consulted for medical management.    Fever has now resolved but abscess slightly increasing on US on 3/16.  US then CT showed increasing abscess.  Will undergo IR drainage today.    Problem List/Assessment and Plan:     Pelvic abscess  Sepsis  - Patient met sepsis criteria initially (fever, leukocytosis, tachycardia)  - CT scan of the abdomen/pelvis as well as ultrasound showed 5.3 cm x 1.4 x 4.8 loculated hypoechoic collection in the cul-de-sac.   - She was started initially on cefotetan and doxycycline, then switched to metronidazole/levofloxacin, then ertapenem, now back to metronidazole/levofloxacin.  - Gonorrhea and Chlamydia PCR's are negative.  - OB/GYN spoke to interventional radiology who felt that the abscess was too small and deep to be accessed percutaneously.  Therefore we will manage the patient with IV antibiotics and monitor her inflammatory markers, fever, and pain  -  Fever has resolved but leukocytosis returned after normalization, today 12  - Continue levofloxacin/metronidazole  - Pain control with Tylenol, Ibuprofen, Oxycodone PRN severe pain  - NPO for IR drainage today per OB/Gyn     Microcytic, hypochromic anemia  - Hb was 11.9 on admission and trending down to 8.2, today has improved to 9.2  - No sign of blood loss  - Probably the initial sample was  hemoconcentrated (had prerenal DENNY at the time) and the trend down is closer to its actual value  - MCV 72 and MCHC 21.4, ferritin is elevated (not surprising with infection), iron is within the normal range.  No indication for iron at this time  - Follow-up in the outpatient setting     Hypokalemia  - Replace per protocol  - Will add magnesium protocol as well     Acute kidney injury - Resolved  - Patient's creatinine was 1.7 on admission, has now normalized with IVF, most recently 0.67.     Hidradenitis suppurativa  - Recently started on Humira, hold while inpatient    Constipation - Resolved  - Patient is having poor appetite due to constipation.  Bowel regimen ordered.  She has now had several soft stools.    Diet: NPO per Anesthesia Guidelines for Procedure/Surgery Except for: Meds    DVT Prophylaxis: Pneumatic Compression Devices  Decker Catheter: not present  Code Status: Full Code      Disposition Plan   Expected discharge: defer to primary team, recommended to prior living arrangement once SIRS/Sepsis treated.  Entered: Chely Caceres MD 03/19/2021, 11:12 AM       The patient's care was discussed with the Bedside Nurse, Patient and Primary team.    Hospitalist Service  [facility]          Interval History (Subjective):      Patient was seen with the OB/Gyn provider this morning.  The patient states that she still is having lower abdominal pressure type pain.  The ibuprofen does help to decrease this.  She did have several loose bowel movements yesterday after bowel regimen was started.  No nausea or vomiting.  No chest pain or shortness of breath.  She is urinating.                  Physical Exam:      Last Vital Signs:  /76 (BP Location: Left arm)   Pulse 95   Temp 99.3  F (37.4  C)   Resp 16   Wt 108.9 kg (240 lb)   SpO2 93%   BMI 38.74 kg/m      General: Awake, alert, no acute distress but does appear fatigued  HEENT: Normocephalic and atraumatic, eyes anicteric and  without scleral injection, EOMI, face symmetric, MMM.  Cardiac: RRR, normal S1, S2. No m/g/r, no LE edema.  Pulmonary: Normal chest rise, normal work of breathing.  Lungs CTAB without crackles or wheezing.  Abdomen: soft, tender in lower abdomen, non-distended.  Normoactive bowel sounds, no guarding or rebound tenderness.  Extremities: no deformities.  Warm, well perfused.  Skin: no rashes or lesions.  Warm and Dry.  Neuro: No focal deficits.  Speech clear.  Coordination and strength grossly normal.  Psych: Alert and oriented x3. Appropriate affect.         Medications:      All current medications were reviewed with changes reflected in problem list.         Data:      All new lab and imaging data was reviewed.   Labs:  Recent Labs   Lab 03/18/21  0642 03/17/21  1426 03/17/21  0657 03/16/21  0926 03/16/21  0926 03/15/21  0312 03/15/21  0312 03/14/21  1831 03/14/21  0551     --   --   --   --   --  138 136 132*   POTASSIUM 3.5 3.7 3.4   < > 3.3*   < > 3.1*  --  3.0*   CHLORIDE 108  --   --   --   --   --  108  --  100   CO2 26  --   --   --   --   --  22  --  20   ANIONGAP 7  --   --   --   --   --  8  --  12   GLC 90  --   --   --   --   --  84  --  97   BUN 3*  --   --   --   --   --  14  --  20   CR 0.68  --  0.67  --  0.81  --  1.05* 1.17* 1.70*   GFRESTIMATED >90  --  >90  --  >90  --  76  --  42*   GFRESTBLACK >90  --  >90  --  >90  --  88  --  49*   JOSEPH 8.0*  --   --   --   --   --  7.9*  --  9.2    < > = values in this interval not displayed.     Recent Labs   Lab 03/19/21  0651 03/18/21  0642 03/17/21  0657   WBC 12.0* 14.8* 9.0   HGB 9.2* 9.0* 8.8*   HCT 30.4* 29.6* 29.4*   MCV 73* 73* 72*    374 335      Imaging:   Recent Results (from the past 24 hour(s))   CT Abdomen Pelvis w Contrast    Narrative    CT ABDOMEN AND PELVIS WITH CONTRAST  3/18/2021 1:42 PM    CLINICAL HISTORY: Abdominal abscess/infection suspected. Suspected PID  with large pelvic abscess.    TECHNIQUE: CT scan of the abdomen  and pelvis was performed following  injection of IV contrast. Multiplanar reformats were obtained. Dose  reduction techniques were used.  CONTRAST: 100 mL [id number]    COMPARISON: CT abdomen and pelvis 3/15/2021. Pelvic ultrasound  3/18/2021.    FINDINGS:   LOWER CHEST: Enlarging but small bilateral pleural effusions with  bibasilar atelectasis.    HEPATOBILIARY: No significant mass or bile duct dilatation. No  calcified gallstones.     PANCREAS: No significant mass, duct dilatation, or inflammatory  change.    SPLEEN: Stable mild splenomegaly.    ADRENAL GLANDS: Normal.    KIDNEYS/BLADDER: No significant mass, stones, or hydronephrosis.  Bladder is unremarkable.    BOWEL: No evidence of bowel obstruction, diverticulitis or  appendicitis. Reactive changes loops of small bowel in the lower  midline abdomen are again noted and minimally changed.    LYMPH NODES: Small probable reactive retroperitoneal and pelvic lymph  nodes are noted.    VASCULATURE: Unremarkable.    PELVIC ORGANS: Uterus and rectum are unremarkable. Bladder is  partially decompressed but otherwise within normal limits. There is a  loculated-appearing fluid collection in the pelvis demonstrating wall  enhancement on series 4, image 181 measuring approximately 7.1 x 6.1  cm. This is most likely a developing pelvic abscess probably related  to suspected underlying pelvic inflammatory disease. This extends from  the paraovarian region bilaterally posteriorly into the cul-de-sac.    ADDITIONAL FINDINGS: New small volume ascites predominantly in the  paracolic gutter regions and pelvis.    MUSCULOSKELETAL: Normal.      Impression    IMPRESSION:   1.  Enlarging pleural effusions and new small volume ascites. These  are likely reactive changes related to pelvic inflammatory disease  with new probable pelvic abscess collection.    2.  Stable prominent retroperitoneal and pelvic lymph nodes along with  mild splenomegaly.    MD Cehly ISRAEL  MD Morris

## 2021-03-19 NOTE — PLAN OF CARE
Patient NPO this am for drain placed in pelvic abscess by IR. Drain placed in Right buttocks; orders to flush Q8 hours. Denies nausea. Pt now able to eat. Passing flatus and had BM this am. Ibuprofen/Atarax/tylenol prn for pain; declines narcotics at this time. Ambulated in osuna with SBA. LS diminished in BLL; Encouraged IS use and deep breathing (noted atelectasis on Xray yesterday).

## 2021-03-19 NOTE — PROGRESS NOTES
Abscess drain placement complete. Pt tolerated. Received 4mg versed, 200mcg fentanyl. Site at Right upper buttock.  Drain sutured and stayfixed in place. Per Dr. Rosas, Drain should be flushed every 8 hours with 5ml saline. Samples of fluid collected and sent to lab. Report given to RN and pt transferred back to 2ms via cart and RN.

## 2021-03-19 NOTE — PLAN OF CARE
Patient alert and oriented, up independently in room. PRN ibuprofen given x1 overnight as well as PRN atarax. Has been NPO since midnight. Bowel sounds hypoactive, patient states she is not passing much gas. Good urine output overnight. Plan to go to IR today to drain abscess. Continue with current plan of care.

## 2021-03-20 VITALS
SYSTOLIC BLOOD PRESSURE: 131 MMHG | OXYGEN SATURATION: 90 % | BODY MASS INDEX: 38.74 KG/M2 | RESPIRATION RATE: 20 BRPM | WEIGHT: 240 LBS | DIASTOLIC BLOOD PRESSURE: 86 MMHG | TEMPERATURE: 98.9 F | HEART RATE: 109 BPM

## 2021-03-20 LAB
BACTERIA SPEC CULT: NO GROWTH
BACTERIA SPEC CULT: NO GROWTH
ERYTHROCYTE [DISTWIDTH] IN BLOOD BY AUTOMATED COUNT: 16.8 % (ref 10–15)
HCT VFR BLD AUTO: 29.1 % (ref 35–47)
HGB BLD-MCNC: 8.7 G/DL (ref 11.7–15.7)
MCH RBC QN AUTO: 21.9 PG (ref 26.5–33)
MCHC RBC AUTO-ENTMCNC: 29.9 G/DL (ref 31.5–36.5)
MCV RBC AUTO: 73 FL (ref 78–100)
PLATELET # BLD AUTO: 455 10E9/L (ref 150–450)
RBC # BLD AUTO: 3.97 10E12/L (ref 3.8–5.2)
SPECIMEN SOURCE: NORMAL
SPECIMEN SOURCE: NORMAL
WBC # BLD AUTO: 9.7 10E9/L (ref 4–11)

## 2021-03-20 PROCEDURE — 250N000011 HC RX IP 250 OP 636: Performed by: INTERNAL MEDICINE

## 2021-03-20 PROCEDURE — 36415 COLL VENOUS BLD VENIPUNCTURE: CPT | Performed by: INTERNAL MEDICINE

## 2021-03-20 PROCEDURE — 99233 SBSQ HOSP IP/OBS HIGH 50: CPT | Mod: GC | Performed by: INTERNAL MEDICINE

## 2021-03-20 PROCEDURE — 85027 COMPLETE CBC AUTOMATED: CPT | Performed by: INTERNAL MEDICINE

## 2021-03-20 PROCEDURE — 250N000013 HC RX MED GY IP 250 OP 250 PS 637: Performed by: OBSTETRICS & GYNECOLOGY

## 2021-03-20 PROCEDURE — 250N000013 HC RX MED GY IP 250 OP 250 PS 637: Performed by: INTERNAL MEDICINE

## 2021-03-20 RX ORDER — LEVOFLOXACIN 500 MG/1
500 TABLET, FILM COATED ORAL DAILY
Qty: 7 TABLET | Refills: 0 | Status: SHIPPED | OUTPATIENT
Start: 2021-03-20 | End: 2024-01-26

## 2021-03-20 RX ORDER — METRONIDAZOLE 500 MG/1
500 TABLET ORAL 3 TIMES DAILY
Qty: 21 TABLET | Refills: 0 | Status: SHIPPED | OUTPATIENT
Start: 2021-03-20 | End: 2021-03-27

## 2021-03-20 RX ORDER — IBUPROFEN 200 MG
600 TABLET ORAL EVERY 4 HOURS PRN
Qty: 40 TABLET | Refills: 0 | Status: SHIPPED | OUTPATIENT
Start: 2021-03-20 | End: 2024-01-26

## 2021-03-20 RX ADMIN — HYDROXYZINE HYDROCHLORIDE 100 MG: 25 TABLET, FILM COATED ORAL at 07:17

## 2021-03-20 RX ADMIN — IBUPROFEN 400 MG: 200 TABLET, FILM COATED ORAL at 07:17

## 2021-03-20 RX ADMIN — SENNOSIDES 1 TABLET: 8.6 TABLET, FILM COATED ORAL at 11:29

## 2021-03-20 RX ADMIN — IBUPROFEN 400 MG: 200 TABLET, FILM COATED ORAL at 11:28

## 2021-03-20 RX ADMIN — METRONIDAZOLE 500 MG: 500 INJECTION, SOLUTION INTRAVENOUS at 11:27

## 2021-03-20 NOTE — PROGRESS NOTES
[facility]  Hospitalist Progress Note  Chely Caceres MD 03/20/21   Text Page  Pager: [phone] (7am-6pm)    Reason for Stay (Diagnosis): pelvic abscess         Assessment and Plan:      Summary of Stay: Yoly Tipton is a 20 year old female who is 2.5 months status post elective termination of 13-week gestation pregnancy and hidradenitis suppurativa recently started on Humira who was admitted on 3/14/2021 with fever, chills and abdominal pain and was found to have abscess formation at the level of the cul-de-sac with fat stranding around the uterus.  Patient is admitted to the OB/GYN service and internal medicine is consulted for medical management.    Fever has now resolved but abscess slightly increasing on US on 3/16.  US then CT showed increasing abscess.  Underwent IR drain placement on 3/19.  Can discharge home on PO antibiotics, follow up with IR on Monday for likely drain removal.    Problem List/Assessment and Plan:     Pelvic abscess  Sepsis  - Patient met sepsis criteria initially (fever, leukocytosis, tachycardia)  - CT scan of the abdomen/pelvis as well as ultrasound showed 5.3 cm x 1.4 x 4.8 loculated hypoechoic collection in the cul-de-sac.   - She was started initially on cefotetan and doxycycline, then switched to metronidazole/levofloxacin, then ertapenem, now back to metronidazole/levofloxacin.  - Gonorrhea and Chlamydia PCR's are negative.  - OB/GYN spoke to interventional radiology who felt that the abscess was too small and deep to be accessed percutaneously.  Therefore we will manage the patient with IV antibiotics and monitor her inflammatory markers, fever, and pain  -  CT done and worsening abscess, s/p IR drain placement on 3/19. Discharge home today on PO antibiotics  - Follow up with IR on Monday for drain removal and follow up with OB/Gyn in clinic     Microcytic, hypochromic anemia  - Hb was 11.9 on admission and trending down to 8.2 but subsequently stable  -  No sign of blood loss  - Probably the initial sample was hemoconcentrated (had prerenal DENNY at the time) and the trend down is closer to its actual value  - MCV 72 and MCHC 21.4, ferritin is elevated (not surprising with infection), iron is within the normal range.  No indication for iron at this time  - Follow-up in the outpatient setting     Hypokalemia  - Replace per protocol  - Will add magnesium protocol as well     Acute kidney injury - Resolved  - Patient's creatinine was 1.7 on admission, has now normalized with IVF, most recently 0.67.     Hidradenitis suppurativa  - Recently started on Humira, hold while inpatient    Constipation - Resolved  - Patient is having poor appetite due to constipation.  Bowel regimen ordered.  She has now had several soft stools.    Diet: Regular Diet Adult    DVT Prophylaxis: Pneumatic Compression Devices  Decker Catheter: not present  Code Status: Full Code      Disposition Plan   Expected discharge: Today, recommended to prior living arrangement once SIRS/Sepsis treated.  Entered: Chely Caceres MD 03/20/2021, 12:43 PM       The patient's care was discussed with the Bedside Nurse, Patient and Patient's Family.    Hospitalist Service  [facility]          Interval History (Subjective):      Patient was discussed with her bedside nurse this morning.  She underwent IR drain placement yesterday.  She is feeling well today and hopeful to go home.  From my standpoint she can go home on oral antibiotics.  She will follow up with IR on Monday for likely drain removal and follow-up with OB/GYN in clinic.  Her mother was also on speaker phone when I saw her today.  I reviewed discharge plans and all of their questions were answered.                  Physical Exam:      Last Vital Signs:  /86 (BP Location: Left arm)   Pulse 109   Temp 98.9  F (37.2  C) (Oral)   Resp 20   Wt 108.9 kg (240 lb)   SpO2 90%   BMI 38.74 kg/m      General: Awake, alert, no  acute distress  HEENT: Normocephalic and atraumatic, eyes anicteric and without scleral injection, EOMI, face symmetric, MMM.  Cardiac: RRR, normal S1, S2. No m/g/r, no LE edema.  Pulmonary: Normal chest rise, normal work of breathing.  Lungs CTAB without crackles or wheezing.  Abdomen: soft, tender in lower abdomen, non-distended.  Normoactive bowel sounds, no guarding or rebound tenderness.  ERNA drain in place.  Extremities: no deformities.  Warm, well perfused.  Skin: no rashes or lesions.  Warm and Dry.  Neuro: No focal deficits.  Speech clear.  Coordination and strength grossly normal.  Psych: Alert and oriented x3. Appropriate affect.         Medications:      All current medications were reviewed with changes reflected in problem list.         Data:      All new lab and imaging data was reviewed.   Labs:  Recent Labs   Lab 03/18/21  0642 03/17/21  1426 03/17/21  0657 03/16/21  0926 03/16/21  0926 03/15/21  0312 03/15/21  0312 03/14/21  1831 03/14/21  0551     --   --   --   --   --  138 136 132*   POTASSIUM 3.5 3.7 3.4   < > 3.3*   < > 3.1*  --  3.0*   CHLORIDE 108  --   --   --   --   --  108  --  100   CO2 26  --   --   --   --   --  22  --  20   ANIONGAP 7  --   --   --   --   --  8  --  12   GLC 90  --   --   --   --   --  84  --  97   BUN 3*  --   --   --   --   --  14  --  20   CR 0.68  --  0.67  --  0.81  --  1.05* 1.17* 1.70*   GFRESTIMATED >90  --  >90  --  >90  --  76  --  42*   GFRESTBLACK >90  --  >90  --  >90  --  88  --  49*   JOSEPH 8.0*  --   --   --   --   --  7.9*  --  9.2    < > = values in this interval not displayed.     Recent Labs   Lab 03/20/21  0648 03/19/21  0651 03/18/21  0642   WBC 9.7 12.0* 14.8*   HGB 8.7* 9.2* 9.0*   HCT 29.1* 30.4* 29.6*   MCV 73* 73* 73*   * [phone]      Imaging:   Recent Results (from the past 24 hour(s))   CT Abdomen Peritonium Abscess Drainage    Narrative    CT ABDOMEN PERITONEUM ABSCESS DRAIN WITH CATHETER PLACEMENT  3/19/2021  12:59 PM      HISTORY:  20-year-old female with a pelvic abscess felt to be  secondary to pelvic inflammatory disease.    COMPARISON: CT scan of the abdomen/pelvis dated 3/18/2021    FINDINGS: After obtaining informed consent, the patient was placed in  a prone position on the CT table. The right gluteal region was prepped  and draped in the usual sterile manner. 1% lidocaine was injected for  local anesthesia. Under CT guidance, using a blunt Trejo needle and  subsequently a 5 American Yueh needle, access into a pelvic abscess  cavity was obtained. A wire was curled in the abscess cavity. Over the  wire, a 10 American drain was placed. Approximately 15 cc of yellow  turbid fluid was aspirated out. A follow-up CT scan showed that the  drain had broken out from the left anterior aspects of the abscess  cavity with pigtail curled in the left lower abdomen. The pigtail was  unlocked and the drain was pulled back into the pelvic abscess cavity.  At this level, 20 cc of purulent fluid was able to be aspirated out.  The catheter was sutured to the patient's skin and hooked to a bulb  for external drainage.    I determined this patient to be an appropriate candidate for the  planned sedation and procedure and reassessed the patient immediately  prior to sedation and procedure. Moderate intravenous conscious  sedation was supervised by me. The patient was independently monitored  by a registered nurse assigned to the Department of radiology using  automated blood pressure, EKG and pulse oximetry. The patient  tolerated the procedure well. There were no immediate postprocedure  complications. The patient's vital signs were monitored by radiology  nursing staff under my supervision and remained stable throughout the  study. Radiation dose for this scan was reduced using automated  exposure control, adjustment of the mA and/or kV according to patient  size, or iterative reconstruction technique.    MEDICATIONS: 4 mg Versed, 200 mcg  fentanyl    Sedation time: 40 minutes      Impression    IMPRESSION: Drain placed into a pelvic abscess as described above.    MD Chely CABRAL MD     I spent 35 minutes at the bedside answering questions about discharge and the care plan

## 2021-03-20 NOTE — DISCHARGE INSTRUCTIONS
Please call Interventional Radiology on Monday morning to schedule drain removal. [phone]. They are open from 7:30am-4 pm, try to call ASAP in the morning.    Follow up with OBGYN in 2 weeks.    You have a primary care appointment scheduled on March 24, 2021 at 11 AM with Dr. Melvin at the [facility].  Please bring a list of all your medications and insurance card with you.  Unless you need assistance, please arrive alone and wear a mask.      [facility]  [address] [place] [address]  [phone]

## 2021-03-20 NOTE — PROGRESS NOTES
Patient discharged home via private car, ambulated off unit with significant other  Patient verbalized and received copy of discharge instructions.  Patient's discharge medications e-prescribed to patient's pharmacy of choice.  Personal belongings gathered and sent with patient.  VSS. IV removed prior to discharge.

## 2021-03-20 NOTE — PROGRESS NOTES
/49 (BP Location: Right arm)   Pulse 113   Temp 99.1  F (37.3  C) (Oral)   Resp 18   Wt 108.9 kg (240 lb)   SpO2 95%   BMI 38.74 kg/m    Patient alert and oriented x4. Up with SBA in room. Calls appropriately. IR placed drain yesterday to upper right buttocks. Flush Q8 hours. Complains of pain to drain site. Using Tylenol, Atarax PRN for pain. Pt agreeable to try ice to affected area, but was unable to tolerate for more then a few minutes.  Tolerating regular diet. Patient has passed flatus, voiding without difficulty, and denies nausea. IV antibiotic, pain control will continue to monitor and provide supportive cares.

## 2021-03-21 LAB
BACTERIA SPEC CULT: NO GROWTH
SPECIMEN SOURCE: NORMAL

## 2021-03-24 LAB
BACTERIA SPEC CULT: NO GROWTH
SPECIMEN SOURCE: NORMAL

## 2021-03-24 NOTE — DISCHARGE SUMMARY
Admitted with pelvic abcess 2.5 months after elective termination of pregnancy. Managed with Antibiotics and requiring IR drain placement on HD 5. Discharged on HD 6 with outpatient management  Antibiotics and  Drain care

## 2021-03-26 ENCOUNTER — HOSPITAL ENCOUNTER (OUTPATIENT)
Dept: CT IMAGING | Facility: CLINIC | Age: 21
Discharge: HOME | End: 2021-03-26
Attending: RADIOLOGY | Admitting: RADIOLOGY
Payer: COMMERCIAL

## 2021-03-26 DIAGNOSIS — N73.9 PELVIC ABSCESS IN FEMALE: ICD-10-CM

## 2021-03-26 LAB
BACTERIA SPEC CULT: NORMAL
Lab: NORMAL
SPECIMEN SOURCE: NORMAL

## 2021-03-26 PROCEDURE — 72194 CT PELVIS W/O & W/DYE: CPT

## 2021-03-26 PROCEDURE — 250N000009 HC RX 250: Performed by: RADIOLOGY

## 2021-03-26 RX ADMIN — SODIUM CHLORIDE 40 ML: 9 INJECTION, SOLUTION INTRAVENOUS at 10:31

## 2021-03-26 NOTE — PROGRESS NOTES
Pt was here today for a sinogram and 20 ML contrast was injected. After the exam Dr Fregoso and Dr Peters spoke and it was determined that we will hook it up to a drainage bag, no further flushes and follow up in 2 weeks with a sinogram. 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 Pt will come in weekly for dressing change.

## 2021-04-06 ENCOUNTER — TELEPHONE (OUTPATIENT)
Dept: CT IMAGING | Facility: CLINIC | Age: 21
End: 2021-04-06

## 2021-04-06 NOTE — TELEPHONE ENCOUNTER
Left voicemail #1 to follow up and see how she is doing with her drain and to schedule her next CT sinogram, will await return phone call.

## 2021-04-07 DIAGNOSIS — Z11.59 ENCOUNTER FOR SCREENING FOR OTHER VIRAL DISEASES: ICD-10-CM

## 2021-04-08 ENCOUNTER — HOSPITAL ENCOUNTER (OUTPATIENT)
Dept: GENERAL RADIOLOGY | Facility: CLINIC | Age: 21
End: 2021-04-08
Attending: RADIOLOGY
Payer: COMMERCIAL

## 2021-04-08 DIAGNOSIS — Z48.00 DRESSING CHANGE: ICD-10-CM

## 2021-04-08 NOTE — PROGRESS NOTES
Dressing changed per this RN, site looks good, minimal drainage, although she still has a fistula and therefore drain to stay in place. 15 min Nursing visit.

## 2021-04-12 DIAGNOSIS — Z11.59 ENCOUNTER FOR SCREENING FOR OTHER VIRAL DISEASES: ICD-10-CM

## 2021-04-12 LAB
SARS-COV-2 RNA RESP QL NAA+PROBE: NORMAL
SPECIMEN SOURCE: NORMAL

## 2021-04-12 PROCEDURE — U0003 INFECTIOUS AGENT DETECTION BY NUCLEIC ACID (DNA OR RNA); SEVERE ACUTE RESPIRATORY SYNDROME CORONAVIRUS 2 (SARS-COV-2) (CORONAVIRUS DISEASE [COVID-19]), AMPLIFIED PROBE TECHNIQUE, MAKING USE OF HIGH THROUGHPUT TECHNOLOGIES AS DESCRIBED BY CMS-2020-01-R: HCPCS | Performed by: RADIOLOGY

## 2021-04-12 PROCEDURE — U0005 INFEC AGEN DETEC AMPLI PROBE: HCPCS | Performed by: RADIOLOGY

## 2021-04-13 LAB
LABORATORY COMMENT REPORT: NORMAL
SARS-COV-2 RNA RESP QL NAA+PROBE: NEGATIVE
SPECIMEN SOURCE: NORMAL

## 2021-04-14 ENCOUNTER — HOSPITAL ENCOUNTER (OUTPATIENT)
Dept: CT IMAGING | Facility: CLINIC | Age: 21
Discharge: HOME | End: 2021-04-14
Attending: RADIOLOGY | Admitting: RADIOLOGY
Payer: COMMERCIAL

## 2021-04-14 DIAGNOSIS — N73.9 PELVIC ABSCESS IN FEMALE: ICD-10-CM

## 2021-04-14 PROCEDURE — 258N000003 HC RX IP 258 OP 636: Performed by: RADIOLOGY

## 2021-04-14 PROCEDURE — 72194 CT PELVIS W/O & W/DYE: CPT

## 2021-04-14 RX ORDER — OLANZAPINE 10 MG/2ML
INJECTION, POWDER, FOR SOLUTION INTRAMUSCULAR
Status: DISCONTINUED
Start: 2021-04-14 | End: 2021-04-15 | Stop reason: HOSPADM

## 2021-04-14 RX ADMIN — SODIUM CHLORIDE 30 ML: 9 INJECTION, SOLUTION INTRAVENOUS at 15:23

## 2021-04-14 NOTE — PROGRESS NOTES
Ct sinogram results reviewed by Dr. Lee.  Order received to remove abscess drain.  Drain removed without difficulty.  Site in excellent condition with no redness noted.  Sterile guaze dressing applied.  Post drain removal care reviewed with pt with stated understanding.  Discharged ambulatory to home per self.

## 2021-04-24 ENCOUNTER — HEALTH MAINTENANCE LETTER (OUTPATIENT)
Age: 21
End: 2021-04-24

## 2021-10-03 ENCOUNTER — HEALTH MAINTENANCE LETTER (OUTPATIENT)
Age: 21
End: 2021-10-03

## 2022-05-15 ENCOUNTER — HEALTH MAINTENANCE LETTER (OUTPATIENT)
Age: 22
End: 2022-05-15

## 2022-09-11 ENCOUNTER — HEALTH MAINTENANCE LETTER (OUTPATIENT)
Age: 22
End: 2022-09-11

## 2023-07-29 ENCOUNTER — HEALTH MAINTENANCE LETTER (OUTPATIENT)
Age: 23
End: 2023-07-29

## 2024-01-22 ASSESSMENT — SLEEP AND FATIGUE QUESTIONNAIRES
HOW LIKELY ARE YOU TO NOD OFF OR FALL ASLEEP WHILE SITTING INACTIVE IN A PUBLIC PLACE: WOULD NEVER DOZE
HOW LIKELY ARE YOU TO NOD OFF OR FALL ASLEEP IN A CAR, WHILE STOPPED FOR A FEW MINUTES IN TRAFFIC: WOULD NEVER DOZE
HOW LIKELY ARE YOU TO NOD OFF OR FALL ASLEEP WHILE LYING DOWN TO REST IN THE AFTERNOON WHEN CIRCUMSTANCES PERMIT: SLIGHT CHANCE OF DOZING
HOW LIKELY ARE YOU TO NOD OFF OR FALL ASLEEP WHILE SITTING QUIETLY AFTER LUNCH WITHOUT ALCOHOL: WOULD NEVER DOZE
HOW LIKELY ARE YOU TO NOD OFF OR FALL ASLEEP WHILE SITTING AND TALKING TO SOMEONE: WOULD NEVER DOZE
HOW LIKELY ARE YOU TO NOD OFF OR FALL ASLEEP WHILE SITTING AND READING: WOULD NEVER DOZE
HOW LIKELY ARE YOU TO NOD OFF OR FALL ASLEEP WHEN YOU ARE A PASSENGER IN A CAR FOR AN HOUR WITHOUT A BREAK: SLIGHT CHANCE OF DOZING
HOW LIKELY ARE YOU TO NOD OFF OR FALL ASLEEP WHILE WATCHING TV: WOULD NEVER DOZE

## 2024-01-26 ENCOUNTER — OFFICE VISIT (OUTPATIENT)
Dept: SURGERY | Facility: CLINIC | Age: 24
End: 2024-01-26
Payer: COMMERCIAL

## 2024-01-26 ENCOUNTER — MYC MEDICAL ADVICE (OUTPATIENT)
Dept: SURGERY | Facility: CLINIC | Age: 24
End: 2024-01-26

## 2024-01-26 VITALS — WEIGHT: 293 LBS | HEIGHT: 66 IN | BODY MASS INDEX: 47.09 KG/M2

## 2024-01-26 VITALS
HEART RATE: 85 BPM | OXYGEN SATURATION: 96 % | SYSTOLIC BLOOD PRESSURE: 138 MMHG | HEIGHT: 66 IN | WEIGHT: 293 LBS | DIASTOLIC BLOOD PRESSURE: 87 MMHG | BODY MASS INDEX: 47.09 KG/M2

## 2024-01-26 DIAGNOSIS — Z13.0 SCREENING FOR IRON DEFICIENCY ANEMIA: ICD-10-CM

## 2024-01-26 DIAGNOSIS — E66.01 MORBID OBESITY WITH BMI OF 60.0-69.9, ADULT (H): Primary | ICD-10-CM

## 2024-01-26 DIAGNOSIS — R06.83 SNORING: ICD-10-CM

## 2024-01-26 DIAGNOSIS — Z13.228 SCREENING FOR ENDOCRINE, NUTRITIONAL, METABOLIC AND IMMUNITY DISORDER: ICD-10-CM

## 2024-01-26 DIAGNOSIS — Z13.29 SCREENING FOR ENDOCRINE, NUTRITIONAL, METABOLIC AND IMMUNITY DISORDER: ICD-10-CM

## 2024-01-26 DIAGNOSIS — Z13.0 SCREENING FOR ENDOCRINE, NUTRITIONAL, METABOLIC AND IMMUNITY DISORDER: ICD-10-CM

## 2024-01-26 DIAGNOSIS — Z13.21 SCREENING FOR ENDOCRINE, NUTRITIONAL, METABOLIC AND IMMUNITY DISORDER: ICD-10-CM

## 2024-01-26 DIAGNOSIS — F50.811 BINGE-EATING DISORDER, MODERATE: ICD-10-CM

## 2024-01-26 PROCEDURE — 99205 OFFICE O/P NEW HI 60 MIN: CPT | Performed by: PHYSICIAN ASSISTANT

## 2024-01-26 RX ORDER — AZELASTINE 1 MG/ML
1-2 SPRAY, METERED NASAL
COMMUNITY
Start: 2023-05-18

## 2024-01-26 RX ORDER — ALBUTEROL SULFATE 90 UG/1
2 AEROSOL, METERED RESPIRATORY (INHALATION) EVERY 4 HOURS PRN
COMMUNITY
Start: 2023-05-18

## 2024-01-26 RX ORDER — HYDROXYZINE HYDROCHLORIDE 50 MG/1
TABLET, FILM COATED ORAL
COMMUNITY
Start: 2023-10-23 | End: 2024-05-06

## 2024-01-26 RX ORDER — MONTELUKAST SODIUM 10 MG/1
10 TABLET ORAL DAILY
COMMUNITY
Start: 2023-05-18 | End: 2024-05-17

## 2024-01-26 RX ORDER — FLUTICASONE PROPIONATE 50 MCG
2 SPRAY, SUSPENSION (ML) NASAL
COMMUNITY
Start: 2023-05-18

## 2024-01-26 NOTE — ASSESSMENT & PLAN NOTE
Noted in [facility] note 5/31/23. Clearance needed for surgical consideration. Could benefit from Contrave (would also assist stopping tobacco/alcohol) as well. She will discuss with her psychiatrist on Monday and let me know.

## 2024-01-26 NOTE — ASSESSMENT & PLAN NOTE
Initial surgical consult. Not a candidate at this time - needs remission/clearance of known moderate binge eating disorder. Referred back to [facility]. Did create task list for reference moving forward. Also discussed medications and plan to look at medical management while starting to work with [facility].

## 2024-01-26 NOTE — TELEPHONE ENCOUNTER
The calls will be set up once pt can resume program.  Also pt will have documented exercise when speaks with RD and provider.  Not sure what to do with this message since pt needs ED eval.  Plz advise - ida Mahajan, MS, RD, RN

## 2024-01-26 NOTE — PROGRESS NOTES
New Bariatric Surgery Consultation Note    2024    RE: Yoly Tipton  MR#: [id number]  : 2000      Referring provider:        No data to display                Chief Complaint/Reason for visit: evaluation for possible weight loss surgery    Dear Clinic, [facility] (General),    I had the pleasure of seeing your patient, Yoly Tipton, to evaluate her obesity and consider her for possible weight loss surgery.      Assessment & Plan   Problem List Items Addressed This Visit       Morbid obesity with BMI of 60.0-69.9, adult (H) - Primary     Initial surgical consult. Not a candidate at this time - needs remission/clearance of known moderate binge eating disorder. Referred back to Eating Disorder clinics. Did create task list for reference moving forward. Also discussed medications and plan to look at medical management while starting to work with Eating Disorder clinics.          Relevant Orders    Physical Therapy Referral    Adult Sleep Eval & Management  Referral    TSH with free T4 reflex    Binge-eating disorder, moderate     Noted in [facility] note 23. Clearance needed for surgical consideration. Could benefit from Contrave (would also assist stopping tobacco/alcohol) as well. She will discuss with her psychiatrist on Monday and let me know.           Other Visit Diagnoses       Snoring        Relevant Orders    Adult Sleep Eval & Management  Referral    Screening for iron deficiency anemia        Relevant Orders    CBC with platelets    Screening for endocrine, nutritional, metabolic and immunity disorder        Relevant Orders    Comprehensive metabolic panel    Hemoglobin A1c    Vitamin D Deficiency             In summary, Yoly Tipton has Class III obesity with a body mass index of Body mass index is 61.98 kg/m . kg/m2 and the comorbidities stated above. She completed an informational seminar and is a possible candidate for  bariatric surgery.   She will have to complete the tasklist below.  Once complete she will see the surgeon for consultation for bariatric surgery. rosemarie  verbalizes understanding of the process to surgery and post operative schedule.     She's very interested in surgery and done a lot of research - her Dad's best friend had it done and was a good experience.     For medications:  AOM Considerations:  Phentermine:  Avoid, hx of amphetamine abuse and has anxiety  Topiramate:    GLP-1:  Denies personal/family hx Medullary Thyroid Ca (incl Fhx), MEN type II, pt denies personal hx pancreatitis/gallbladder issues/gastoparesis  Hx.  Discussed mechanism of action, common side effects, and monitoring for pancreatitis/gallbladder problems/low sugars/MTC/MEN2.   Naltrexone:  Denies using opiates/liver issues  Wellbutrin:  Used prior - no hx of seizures, does endorse some anxiety  Metformin:    Contrave:  Qsymia: avoid d/t phentermine         BARIATRIC TASK LIST  Bariatric Task List  First item to do: Do an evaluation at an eating disorder clinic for evaluation of possible binge eating. If cleared - send us a [employer] message so we can get the Release Of Information to you so we can get that eval on the chart to schedule with our dietitians and start the bariatric task list. If your evaluation is positive - let them know that you're working towards bariatric surgery and once cleared by them - send us a [employer] so we can get the information on file and schedule another visit to update/review Bariatric surgical process.    General reference:  Weight loss goal to be determined once cleared to formally start process  Have preoperative laboratory tests drawn. -  Call [phone] for an appt.    Psychological Evaluation with MMPI and clearance for weight loss surgery.- Call [phone] to schedule.   Achieve clearance from dietitian to see surgeon. Call [phone] to schedule   Sleep clearance, schedule now!  Mental health  clearance  Nicotine/THC/reduce alcohol Clearance  Documented exercise program per insurance  5 [facility] phone course sessions  Please view our Weight Loss Surgery Seminar at the following website:     [email or web address]     Birth control plan with PCP/GYN  Clarify Biological Dad's history - cancer vs bleeding/clotting disorders.   Recommend pursing regular exercise. 5 minutes of exercise every other day or every 2 days is a great place to start with aiming for 30 minutes 5 times a week as an end goal.  If you can, try to get some strength training twice weekly while losing weight to help preserve your muscle!  Just for reference for Monday with psychiatry: Contrave is an FDA approved medication for weight management and is made of Wellbutrin (an anti-depressant - could help w/stopping tobacco) and Naltrexone (an anti-addiction - can help reduce alcohol) medications. For consideration. After labs, if interested in medications - send me a [employer] message, include any medications started by psychiatry and we would need labs done first as well.     For reference:  Insurance - contacted and in network, has coverage, documented exercise program, check thyroid    Videos - didn't receive link    Follow-up: Recommend schedule in 3 months for medical management    72 minutes spent on the date of the encounter doing chart review, history and exam, review test results, counseling, developing plan of care, documentation, and further activities as noted above.       HISTORY OF PRESENT ILLNESS:      1/22/2024    11:17 AM   Weight Loss History Reviewed with Patient   How long have you been overweight? Since puberty   What is the most that you have ever weighed 385   What is the most weight you have lost? 100   I have tried the following methods to lose weight Watching portions or calories    Exercise    Weight Watchers    Pre packaged meals ex: Nutrisystem   I have tried  the following weight loss medications? (Check all that apply) None       CO-MORBIDITIES OF OBESITY INCLUDE:      1/22/2024    11:17 AM   --   I have the following health issues associated with obesity Sleep Apnea    Asthma   Not sure if has PETAR    PAST MEDICAL HISTORY:  Past Medical History:   Diagnosis Date    Anxiety     As far back as i can remember    Asthma     Depression     Depressive disorder     As far back as i can remember    Hidradenitis suppurativa        PAST SURGICAL HISTORY:  Past Surgical History:   Procedure Laterality Date    Potter teeth extraction       No personal or family history of blood clots or anesthesia concerns. Biological Dad passed from blood cancer - doesn't not believe any hx of blood clots       SOCIAL HISTORY:       1/22/2024    11:17 AM   Social History Questions Reviewed With Patient   Which best describes your employment status (select all that apply) I work days   If you work, what is your occupation? In between jobs, I normally [employer]/ [employer]   Which best describes your marital status in a relationship   Who do you have in your support network that can be available to help you for the first 2 weeks after surgery? My boyfriend, my parents, and siblings   Who can you count on for support throughout your weight loss surgery journey? My boyfriend, my parents, and siblings     Someone to stay with you right after surgery? Either boyfriend, mom/dad    HABITS:      1/22/2024    11:17 AM   --   How often do you drink alcohol? 2-4 times a month   If you do drink alcohol, how many drinks might you have in a day? (one drink = 5 oz. wine, 1 can/bottle of beer, 1 shot liquor) 5 or 6   Do you currently use any of the following Nicotine products? cigarettes   Have you ever used any of the following nicotine products? Cigarettes   Have you or are you currently using street drugs or prescription strength medication for which you do not have a prescription for? No   Do you have a history  of chemical dependency (alcohol or drug abuse)? Yes - when younger (around 18 years old) got into a bad crowd and was using meth. Clean since 2020. No IV drug use. THC when younger but reduced now    Discussed alcohol, nicotine, chemical dependency, NSAIDs, caffeine    PSYCHOLOGICAL HISTORY:       1/22/2024    11:17 AM   Psychological History Reviewed With Patient   Have you ever attempted suicide? More than 10 years ago.   Have you had thoughts of suicide in the past year? No   Have you ever been hospitalized for mental illness or a suicide attempt? More than 10 years ago.   Do you have a history of chronic pain? No   Have you ever been diagnosed with fibromyalgia? No   Are you currently being treated for any of the following? (select all that apply) Depression    Anxiety    Post traumatic stress disorder    I take medication or see a therapist for another mental illness   Are you currently seeing a therapist or counselor? Yes   Are you currently seeing a psychiatrist? Yes     No SI/attempts within past year - last was a freshman in high school and none since    Discussed mental health clearance        1/22/2024    11:17 AM   Questions Reviewed With Patient   How ready are you to make changes regarding your weight? Number 1 = Not ready at all to make changes up to 10 = very ready. 10   How confident are you that you can change? 1 = Not confident that you will be successful making changes up to 10 = very confident. 10       ROS:      1/22/2024    11:17 AM   --   Skin Skin fold rashes (groin or other folds)   HEENT Headaches    Dizziness/lightheadedness   Musculoskeletal Joint Pain   Cardiovascular Shortness of breath with activity   Pulmonary Snoring   Gastrointestinal Heartburn    Reflux   Genitourinary None of the above   Hematological None of the above   Neurological None of the above   Female only Regular menstrual cycles     dizziness or lightheadedness - reports will only happen when her asthma is flaring with  her breathing  Missing molars - none  Severity of heartburn or reflux - not severe/bad, but can wake up at night. Taking TUMs prn - will use once-twice weekly. No pain/difficulty swallowing/things getting discussed - discussed can get worse after sleeve.   Heart or lung concerns - not working with a pulmonologist  Any specialists - an allergist  Birth control - not currently    EATING BEHAVIORS:      1/22/2024    11:17 AM   --   Do you currently binge eat (eat a large amount of food in a short time)? No   Are you an emotional eater? Yes   Do you get up to eat after falling asleep? No   Can you afford 3 meals a day? Yes   Can you afford 50-60 dollars a month for vitamins? Yes     From 5/31/23  Surgical center televisit note:   Current Binge Eating Symptoms:   Objective binges at least 1x per week for the past 3 months: True  Patient distressed by binges: Pos  Patient senses lack of control over eating during the binges: True  Eating until uncomfortably full: False  Eating large amounts when not physically hungry: False  Eating much more rapidly than usual: True  Eating alone due to being embarrassed by the amount eaten: True  Patient feels disgusted, depressed, or guilty after overeating: True  Addicted to food: True      Diagnosed with moderate binge eating disorder at [facility]. Did not participate in the group. Patient states the initial program wasn't what she thought it would be, but she never began the group treatment.     Does still engage in binge eating behaviors. Reports binging on snacks, mindlessly eating. Feels like she will zone out while eating and feel guilty afterwards. Her mother notices that the patient wants to stop eating, but can't in the moment.     EXERCISE:      1/22/2024    11:17 AM   --   How often do you exercise? Less than 1 time per week   What is the duration of your exercise (in minutes)? 10 Minutes   What types of exercise do you do? walking   What keeps you from being more active?  "Pain    Shortness of breath    Lack of Time    Too tired     Discussed regular exercise - Get Moving referral placed     MEDICATIONS:  Current Outpatient Medications   Medication    albuterol (PROAIR HFA/PROVENTIL HFA/VENTOLIN HFA) 108 (90 Base) MCG/ACT inhaler    azelastine (ASTELIN) 0.1 % nasal spray    diphenhydrAMINE-acetaminophen (TYLENOL PM)  MG tablet    fluticasone (FLONASE) 50 MCG/ACT nasal spray    hydrOXYzine HCl (ATARAX) 50 MG tablet    montelukast (SINGULAIR) 10 MG tablet     No current facility-administered medications for this visit.      Discussed ibuprofen - uses occasionally    Not currently taking buspar or prozac    LABS AND RECORDS REVIEWED:  Labs reviewed in [employer]    Labs from 2021 and will need repeat    3/18/2021 CBC appears to have anemia    PHYSICAL EXAM:  /87   Pulse 85   Ht 5' 6\" (1.676 m)   Wt (!) 384 lb (174.2 kg)   SpO2 96%   BMI 61.98 kg/m    GENERAL: Healthy, alert and no distress  EYES: Eyes grossly normal to inspection.    RESP: No audible wheeze, cough, or visible cyanosis.  No increased work of breathing. Initial mild inspiratory wheeze which resolved with further breathing  Oral: Molars intact  Neck: No swellings noted   Heart: regular rate and rhythm. No significant murmurs, rubs, or gallops  Abdomen: No rebound or guarding. No CVA tenderness. Intact bowel sounds.   SKIN: Visible skin clear.   NEURO: Mentation and speech appropriate for age.  PSYCH: Mentation appears normal, affect normal/bright, judgement and insight intact, normal speech and appearance well-groomed.    Vivian Alcocer PA-C   "

## 2024-01-26 NOTE — PATIENT INSTRUCTIONS
Nice to meet you today.  Below is our plan we discussed.-  Vivian Alcocer PA-C   Bariatric Task List  First item to do: Do an evaluation at an eating disorder clinic for evaluation of possible binge eating. If cleared - send us a MyChart message so we can get the Release Of Information to you so we can get that eval on the chart to schedule with our dietitians and start the bariatric task list. If your evaluation is positive - let them know that you're working towards bariatric surgery and once cleared by them - send us a MyChart so we can get the information on file and schedule another visit to update/review Bariatric surgical process.    General reference:  Weight loss goal to be determined once cleared to formally start process  Have preoperative laboratory tests drawn. -  Call [phone] for an appt.    Psychological Evaluation with MMPI and clearance for weight loss surgery.- Call [phone] to schedule.   Achieve clearance from dietitian to see surgeon. Call [phone] to schedule   Sleep clearance, schedule now!  Mental health clearance  Nicotine/THC/reduce alcohol Clearance  Documented exercise program per insurance  5 [facility] phone course sessions  Please view our Weight Loss Surgery Seminar at the following website:     [email or web address]     Birth control plan with PCP/GYN  Clarify Biological Dad's history - cancer vs bleeding/clotting disorders.   Recommend pursing regular exercise. 5 minutes of exercise every other day or every 2 days is a great place to start with aiming for 30 minutes 5 times a week as an end goal.  If you can, try to get some strength training twice weekly while losing weight to help preserve your muscle!  Just for reference for Monday with psychiatry: Contrave is an FDA approved medication for weight management and is made of Wellbutrin (an anti-depressant - could help w/stopping tobacco) and Naltrexone (an  anti-addiction - can help reduce alcohol) medications. For consideration. After labs, if interested in medications - send me a [employer] message, include any medications started by psychiatry and we would need labs done first as well.   In Person Pre-Surgery Visit (Bariatric Education) with your provider Call [phone] to schedule   _______________________________________  Road to Surgery:   1. Complete the above tasklist  2. Following tasklist completion, see the surgeon, go over your surgery, and sign consents  3. File will be sent for insurance approval  4.Once approved, our  will call to set up your surgery  (It usually takes 4-6 months to get to surgery from your initial consult)  After Surgery:   Remember, Obesity is a chronic disease, At some point weight gain will occur and hunger may return.   Follow up is important to keep your weight off and your vitamin levels up.  Labs will be monitored every 3 months for the first year and yearly thereafter.  Vitamin supplementation is a lifelong. You will take:  2 Multivitamins containing 18mg of iron  B-12 daily or by injection monthly  Vitamin D3 5000 IU daily   Calcium citrate 2 daily  Thiamine 100 mg weekly   Iron supplement once daily if you are a menstruating female  Prevent ulcers by avoiding tobacco and anti-inflammatories (NSAIDS) forever.  NSAIDS examples: Aspirin, Ibuprofen, Naproxen. Tylenol is fine.    Alcohol affects you differently. There is a 10% increase of Alcohol Use Disorder in patients with bariatric surgery.   If you have even ONE drink DO NOT DRIVE.

## 2024-02-01 ENCOUNTER — LAB (OUTPATIENT)
Dept: LAB | Facility: CLINIC | Age: 24
End: 2024-02-01
Payer: COMMERCIAL

## 2024-02-01 DIAGNOSIS — Z13.0 SCREENING FOR IRON DEFICIENCY ANEMIA: ICD-10-CM

## 2024-02-01 DIAGNOSIS — E66.01 MORBID OBESITY WITH BMI OF 60.0-69.9, ADULT (H): ICD-10-CM

## 2024-02-01 DIAGNOSIS — Z13.21 SCREENING FOR ENDOCRINE, NUTRITIONAL, METABOLIC AND IMMUNITY DISORDER: ICD-10-CM

## 2024-02-01 DIAGNOSIS — Z13.228 SCREENING FOR ENDOCRINE, NUTRITIONAL, METABOLIC AND IMMUNITY DISORDER: ICD-10-CM

## 2024-02-01 DIAGNOSIS — Z13.29 SCREENING FOR ENDOCRINE, NUTRITIONAL, METABOLIC AND IMMUNITY DISORDER: ICD-10-CM

## 2024-02-01 DIAGNOSIS — Z13.0 SCREENING FOR ENDOCRINE, NUTRITIONAL, METABOLIC AND IMMUNITY DISORDER: ICD-10-CM

## 2024-02-01 LAB
ERYTHROCYTE [DISTWIDTH] IN BLOOD BY AUTOMATED COUNT: 14.2 % (ref 10–15)
HBA1C MFR BLD: 5.3 % (ref 0–5.6)
HCT VFR BLD AUTO: 44.3 % (ref 35–47)
HGB BLD-MCNC: 14.1 G/DL (ref 11.7–15.7)
MCH RBC QN AUTO: 26 PG (ref 26.5–33)
MCHC RBC AUTO-ENTMCNC: 31.8 G/DL (ref 31.5–36.5)
MCV RBC AUTO: 82 FL (ref 78–100)
PLATELET # BLD AUTO: 277 10E3/UL (ref 150–450)
RBC # BLD AUTO: 5.42 10E6/UL (ref 3.8–5.2)
WBC # BLD AUTO: 8.1 10E3/UL (ref 4–11)

## 2024-02-01 PROCEDURE — 80053 COMPREHEN METABOLIC PANEL: CPT

## 2024-02-01 PROCEDURE — 36415 COLL VENOUS BLD VENIPUNCTURE: CPT

## 2024-02-01 PROCEDURE — 84443 ASSAY THYROID STIM HORMONE: CPT

## 2024-02-01 PROCEDURE — 83036 HEMOGLOBIN GLYCOSYLATED A1C: CPT

## 2024-02-01 PROCEDURE — 85027 COMPLETE CBC AUTOMATED: CPT

## 2024-02-01 PROCEDURE — 82306 VITAMIN D 25 HYDROXY: CPT

## 2024-02-02 LAB
ALBUMIN SERPL BCG-MCNC: 4.2 G/DL (ref 3.5–5.2)
ALP SERPL-CCNC: 96 U/L (ref 40–150)
ALT SERPL W P-5'-P-CCNC: 26 U/L (ref 0–50)
ANION GAP SERPL CALCULATED.3IONS-SCNC: 12 MMOL/L (ref 7–15)
AST SERPL W P-5'-P-CCNC: 27 U/L (ref 0–45)
BILIRUB SERPL-MCNC: 0.3 MG/DL
BUN SERPL-MCNC: 11 MG/DL (ref 6–20)
CALCIUM SERPL-MCNC: 9.5 MG/DL (ref 8.6–10)
CHLORIDE SERPL-SCNC: 105 MMOL/L (ref 98–107)
CREAT SERPL-MCNC: 0.76 MG/DL (ref 0.51–0.95)
DEPRECATED HCO3 PLAS-SCNC: 22 MMOL/L (ref 22–29)
EGFRCR SERPLBLD CKD-EPI 2021: >90 ML/MIN/1.73M2
GLUCOSE SERPL-MCNC: 83 MG/DL (ref 70–99)
POTASSIUM SERPL-SCNC: 4.5 MMOL/L (ref 3.4–5.3)
PROT SERPL-MCNC: 7.2 G/DL (ref 6.4–8.3)
SODIUM SERPL-SCNC: 139 MMOL/L (ref 135–145)
TSH SERPL DL<=0.005 MIU/L-ACNC: 2.03 UIU/ML (ref 0.3–4.2)
VIT D+METAB SERPL-MCNC: 19 NG/ML (ref 20–50)

## 2024-02-20 ENCOUNTER — TELEPHONE (OUTPATIENT)
Dept: SURGERY | Facility: CLINIC | Age: 24
End: 2024-02-20
Payer: COMMERCIAL

## 2024-02-20 DIAGNOSIS — E66.01 MORBID OBESITY WITH BMI OF 60.0-69.9, ADULT (H): Primary | ICD-10-CM

## 2024-02-20 RX ORDER — SEMAGLUTIDE 0.5 MG/.5ML
0.5 INJECTION, SOLUTION SUBCUTANEOUS WEEKLY
Qty: 2 ML | Refills: 1 | Status: SHIPPED | OUTPATIENT
Start: 2024-02-20 | End: 2024-04-09

## 2024-02-20 RX ORDER — SEMAGLUTIDE 0.25 MG/.5ML
0.25 INJECTION, SOLUTION SUBCUTANEOUS WEEKLY
Qty: 2 ML | Refills: 0 | Status: SHIPPED | OUTPATIENT
Start: 2024-02-20 | End: 2024-04-09

## 2024-02-20 RX ORDER — SEMAGLUTIDE 1 MG/.5ML
1 INJECTION, SOLUTION SUBCUTANEOUS WEEKLY
Qty: 2 ML | Refills: 1 | Status: SHIPPED | OUTPATIENT
Start: 2024-02-20 | End: 2024-04-09

## 2024-02-20 NOTE — TELEPHONE ENCOUNTER
Prior Authorization Retail Medication Request    Medication/Dose: PA:  Semaglutide-Weight Management (WEGOVY) 0.25 MG/0.5ML pen 2 mL 0 2/20/2024 - --  Sig - Route: Inject 0.25 mg Subcutaneous once a week For 4 weeks - Subcutaneou  Semaglutide-Weight Management (WEGOVY) 0.5 MG/0.5ML pen 2 mL 1 2/20/2024 - --  Sig - Route: Inject 0.5 mg Subcutaneous once a week - Subcutaneous  Semaglutide-Weight Management (WEGOVY) 1 MG/0.5ML pen 2 mL 1 2/20/2024 - --  Sig - Route: Inject 1 mg Subcutaneous once a week - Subcutaneous    Diagnosis and ICD code (if different than what is on RX): [E66.01, Z68.44]   New/renewal/insurance change PA/secondary ins. PA: NEW  Previously Tried and Failed: Diet/lifestyle, Phentermine: Avoid, hx of amphetamine abuse and has anxiety   Rationale:  Weight management    Insurance   Primary:     [employer] OPEN ACCESS     Insurance ID:  [id number]     Pharmacy Information (if different than what is on RX)  Name:    [facility] [place], [place] - [address]     Phone:  [phone]   Fax:     [phone]

## 2024-02-21 NOTE — TELEPHONE ENCOUNTER
Retail Pharmacy Prior Authorization Team   Phone: [phone]      EPA DENIED: LETTER ATTACHED

## 2024-02-22 NOTE — TELEPHONE ENCOUNTER
Retail Pharmacy Prior Authorization Team   Phone: [phone]    PRIOR AUTHORIZATION DENIED    Medication: WEGOVY 0.25 MG/0.5ML SC SOAJ  Insurance Company: [employer] - Phone [phone] Fax [phone]  Denial Date: 2/21/2024  Denial Reason(s): REQUIRES TWO OR MORE CLINIC APPTS DISCUSSING WEIGHT LOSS WITHIN THE PAST 6 MONTHS      Appeal Information: IF PROVIDER WOULD LIKE TO APPEAL THIS DECISION PLEASE PROVIDE THE PA TEAM WITH A LETTER OF MEDICAL NECESSITY      Patient Notified: No

## 2024-02-23 NOTE — TELEPHONE ENCOUNTER
Can appeal at later date once pt has had 2 or more appointments discussing weight.    Lisa GRANT RN

## 2024-03-12 NOTE — PROGRESS NOTES
"Sarai is a 23 year old who is being evaluated via a billable video visit.      The patient has been notified of following:     \"This video visit will be conducted via a call between you and your physician/provider. We have found that certain health care needs can be provided without the need for an in-person physical exam.  This service lets us provide the care you need with a video conversation.  If a prescription is necessary we can send it directly to your pharmacy.  If lab work is needed we can place an order for that and you can then stop by our lab to have the test done at a later time.    Video visits are billed at different rates depending on your insurance coverage.  Please reach out to your insurance provider with any questions.    If during the course of the call the physician/provider feels a video visit is not appropriate, you will not be charged for this service.\"    Patient has given verbal consent for Video visit? Yes    How would you like to obtain your AVS? MyChart    If the video visit is dropped, the invitation should be resent by: My Chart    Will anyone else be joining your video visit? No    I    Video-Visit Details    Type of service:  Video Visit    Originating Location (pt. Location): in [place]    Distant Location (provider location):   [facility]    Platform used for Video Visit: Indiewalls    Video Start Time: 12:53 PM    Video End Time:1:08 PM    3/19/2024      Return Medical Weight Management Note     Yoly Tipton  MRN:  [id number]  :  2000    Assessment & Plan   Problem List Items Addressed This Visit       Morbid obesity with BMI of 60.0-69.9, adult (H) - Primary     Healthy habits to assist with further weight loss were discussed. Sarai will start Naltrexone from psychiatry and plan to look at starting Wegovy once approved by her [employer] therapist and insurance.               For medications:  AOM Considerations:  Phentermine:   Avoid, " hx of amphetamine abuse and has anxiety  Topiramate:       GLP-1:        Denies personal/family hx Medullary Thyroid Ca (incl Fhx), MEN type II, pt denies personal hx pancreatitis/gallbladder issues/gastoparesis  Hx.  Discussed mechanism of action, common side effects, and monitoring for pancreatitis/gallbladder problems/low sugars/MTC/MEN2.   Naltrexone:      Denies using opiates/liver issues - naltrexone started by psychiatry  Wellbutrin:       Used prior - no hx of seizures, does endorse some anxiety  Metformin:         Contrave:  Qsymia: avoid d/t phentermine                          PATIENT INSTRUCTIONS:  Plan:  Let's appeal for Wegovy, once approved (and approved by your therapist):  Start Wegovy (semaglutide)   0.25 mg once weekly for 4 weeks,   if tolerating increase to 0.5 mg weekly for 4 weeks,   if tolerating increase to 1 mg weekly    May use famotidine 20 mg twice daily as needed for nausea/heartburn when starting the medication.     2. For Naltrexone:  Start with 1/4 tablet in the morning for 1 week,  If tolerating increase to 1/4 tablet twice daily for 1 week,  If tolerating increase to 1/2 tab in the morning and 1/4 tab in the evening for 1 week,  If tolerating, can increase to 1/2 tablet twice daily going forwards      Goals:  Great changes!  Continue to work with dietitians with [facility].  Continue to Focus on healthy food choices - prioritizing protein and veggies in your meals. I recommend eating every 4-6 hours to reduce the risk of low blood sugars and keep your metabolism up during the day.  Great job with exercising changes - please continue to invest in yourself with regular exercise. Continue to strive for a range for 150-300 minutes of exercise for weight maintenance and incorporating strength training twice a week to help preserve muscle!      FOLLOW-UP:    Please call [phone] to schedule your next visit in 3-4 months.     22 minutes spent on the date of the encounter doing chart  review, history and exam, result review, counseling, developing plan of care, documentation, and further activities as noted      INTERVAL HISTORY:  Sarai returns for medical weight management follow up.  Last seen on 1/26/2024 by myself and was initially interested in doing surgery but was referred for possible binge eating disorder.  A tentative task list was created pending that evaluation.  We did try for Wegovy as well for medical management.  For Wegovy, this did come back stating that we needed to have at least 2 visits documenting behavioral changes in 6 months and then we could attempt to appeal for the medication.  This is now her second visit.    Regarding surgery/possible binge eating this was positive and she has been working with Shellie Alaniz as well for moderate binge eating disorder.  Therefore we will put bariatric surgery process on hold while continuing to work with them. Working on eating within 30 min of waking from their group. Focusing on proteins/veggies.     Working on getting sleep more under control - working on getting a routine down with the dietitians. Doing a meal-snack-meal-snack-meal combo. They're not doing a formal program (frequency not high enough for that)     Getting a lot more walking in - had been at 1k steps daily and since our visit now getting 5-7k steps. Also lots of lifting.  Should improve with weather as well. Will plan to get walks in with boyfriend. Going to [place] planned. Doing more housework very physical.     Was recommended by therapist to wait on starting medications for a bit while working with them. Wants to see what she can lose prior to talking to surgery.     WEIGHT METRICS:  Body mass index is 61.98 kg/m .   Current Weight: (!) 384 lb (174.2 kg) (pt reported)  Last Visits Weight: 384 lb (174.2 kg)  Initial Weight (lbs): 384 lbs  Cumulative weight loss (lbs): 0  Weight Loss Percentage: 0%    Wt Readings from Last 10 Encounters:   03/19/24 (!) 384 lb  "(174.2 kg)   01/26/24 (!) 384 lb (174.2 kg)   01/26/24 (!) 384 lb (174.2 kg)   03/14/21 240 lb (108.9 kg)   12/21/20 230 lb (104.3 kg)   11/27/20 230 lb (104.3 kg)                 3/12/2024     2:38 PM   Weight Loss Medication History Reviewed With Patient   Which weight loss medications are you currently taking on a regular basis? None   If you are not taking a weight loss medication that was prescribed to you, please indicate why: Other   Are you having any side effects from the weight loss medication that we have prescribed you? No           3/12/2024     2:38 PM   Changes and Difficulties   I have made the following changes to my diet since my last visit: Working with dietitian to start eating 3 meals a day plus 2 snacks in between   With regards to my diet, I am still struggling with: Eating breakfast within 30 minutes of waking up   I have made the following changes to my activity/exercise since my last visit: Started doing instacart again which got my steps up from 1,000 a day to 5,000/7,000 a day   With regards to my activity/exercise, I am still struggling with: My flexibility       LABS:  Reviewed in Epic    2/1/2024 hemoglobin A1c normal  CMP normal    BP Readings from Last 6 Encounters:   01/26/24 138/87   03/20/21 131/86   12/21/20 130/70   11/27/20 124/71   09/10/19 132/84       Pulse Readings from Last 6 Encounters:   01/26/24 85   03/20/21 109   12/21/20 74   11/27/20 89   09/10/19 84       PE:  Ht 5' 6\" (1.676 m)   Wt (!) 384 lb (174.2 kg)   BMI 61.98 kg/m    GENERAL: Healthy, alert and no distress  EYES: Eyes grossly normal to inspection.    RESP: No audible wheeze, cough, or visible cyanosis.  No increased work of breathing.    SKIN: Visible skin clear. No significant rash, abnormal pigmentation or lesions.  NEURO: Mentation and speech appropriate for age.  PSYCH: Mentation appears normal, affect normal/bright, judgement and insight intact, normal speech and appearance " well-groomed.      Sincerely,      Vivian Alcocer PA-C

## 2024-03-19 ENCOUNTER — VIRTUAL VISIT (OUTPATIENT)
Dept: SURGERY | Facility: CLINIC | Age: 24
End: 2024-03-19
Payer: COMMERCIAL

## 2024-03-19 VITALS — BODY MASS INDEX: 47.09 KG/M2 | WEIGHT: 293 LBS | HEIGHT: 66 IN

## 2024-03-19 DIAGNOSIS — E66.01 MORBID OBESITY WITH BMI OF 60.0-69.9, ADULT (H): Primary | ICD-10-CM

## 2024-03-19 PROCEDURE — 99213 OFFICE O/P EST LOW 20 MIN: CPT | Mod: 95 | Performed by: PHYSICIAN ASSISTANT

## 2024-03-19 NOTE — PATIENT INSTRUCTIONS
"To ensure quality you may receive a patient satisfaction survey. The greatest compliment you can give is \"Likely to Recommend.\"    Nice to talk with you today.  Thank you for your trust. Below is the plan discussed.-  Vivian Alcocer PA-C     Plan:  Let's appeal for Wegovy, once approved (and approved by your therapist):  Start Wegovy (semaglutide)   0.25 mg once weekly for 4 weeks,   if tolerating increase to 0.5 mg weekly for 4 weeks,   if tolerating increase to 1 mg weekly    May use famotidine 20 mg twice daily as needed for nausea/heartburn when starting the medication.     2. For Naltrexone:  Start with 1/4 tablet in the morning for 1 week,  If tolerating increase to 1/4 tablet twice daily for 1 week,  If tolerating increase to 1/2 tab in the morning and 1/4 tab in the evening for 1 week,  If tolerating, can increase to 1/2 tablet twice daily going forwards      Goals:  Great changes!  Continue to work with dietitians with [facility].  Continue to Focus on healthy food choices - prioritizing protein and veggies in your meals. I recommend eating every 4-6 hours to reduce the risk of low blood sugars and keep your metabolism up during the day.  Great job with exercising changes - please continue to invest in yourself with regular exercise. Continue to strive for a range for 150-300 minutes of exercise for weight maintenance and incorporating strength training twice a week to help preserve muscle!      FOLLOW-UP:    Please call [phone] to schedule your next visit in 3-4 months.     WEGOVY (semaglutide)      Wegovy (semaglutide) injection 2.4 mg is an injectable prescription medicine used for adults with obesity (BMI ?30) or overweight (excess weight) (BMI ?27) who also have weight-related medical problems to help them lose weight and keep the weight off.  It is a GLP-1 agonist medication. GLP1 agonists stimulate the hormone GLP-1 in your body o allow you to feel full quickly and stay full longer.    Due to " "the shortage, You may need to be persistent and patient to get these initial dosages due to the shortage.  Once you are able to obtain the 0.25 and 0.5 mg and 1 mg dose \"12 weeks of therapy\" you can begin treatment.     Directions:  Start Wegovy (semaglutide) 0.25 mg once weekly for 4 weeks, then if tolerating increase to 0.5 mg weekly for 4 weeks, then if tolerating increase to 1 mg weekly for 4 weeks, then if tolerating increase to 1.7 mg weekly for 4 weeks, then if tolerating increase to 2.4 mg weekly thereafter.      -Each Wegovy pen is a once weekly single-dose prefilled pen with a pen injector already built within the pen. Discard the Wegovy pen after use in sharps container.     Common Side Effects:    nausea, headache, diarrhea, stomach upset.  If these become unmanageable call or mychart.    Serious Side effects:   Pancreatitis (inflammation of the pancreas) has been associated with this type of medication, but is very rare.Symptoms of pancreatitis include: Pain in your upper stomach area which may travel to your back and be worse after eating.     Storage:   Store the prescription in the refrigerator. Once it is time to use the Wegovy pen, you can keep the pen at room temperature and it is good for up to 28 days at room temperature.     How to inject:  For a video on how to use the pen please go to:  [email or web address]://[email or web address]/about-wegovy/how-to-use-the-wegovy-pen.html#itemTwo       For any questions or concerns please send a disco volante message to our team or call our weight management call center at [phone] during regular business hours. For questions during evenings or weekends your messages will be addressed during the next business day.  For emergencies please call 911 or seek immediate medical care.     There is a small chance you may have some low blood sugar after taking the medication.   The signs of low blood sugar are:  Weakness  Shaky   Hungry  Sweating  Confusion      See below for ways to treat " low blood sugar without adding in lots of extra calories.      Treating Low Blood Sugar    If you have symptoms of low blood sugar (sweating, shaking, dizzy, confused) eat 15 grams of carbs and wait 15 minutes:    Glucose Tabs are best for sugars under 70 -  Dex4 or BD Glucose tablets are good, you will need to take 3-4 of these to equal 15 grams.     One small box of raisins  4 oz fruit juice box or   cup fruit juice  1 small apple  1 small banana    cup canned fruit in water    English muffin or a slice of bread with jelly   1 low fat frozen waffle with sugar-free syrup    cup cottage cheese with   cup frozen or fresh blueberries  1 cup skim or low-fat milk    cup whole grain cereal  4-6 crackers such as Triscuits      This medication is usually not covered by insurance and can be quite expensive. Sometimes a prior authorization is required, which may take up to 1-2 weeks for an insurance company to make a decision if they will cover the medication. Please be patient, you will be notified after a decision has been made.    Contact the nurse via [employer] or call [phone] if you have any questions or concerns. (Do not stop taking it if you don't think it's working. For some people it works without them knowing it.)     In order to get refills of this or any medication we prescribe you must be seen in the medical weight mgmt clinic every 2-4 months.    Using Your Wegovy Pen  Medicine (semaglutide)    Storing your pens  Store your pens in the refrigerator until you are ready to use them. Don't let them freeze.  Your pen may be stored at room temperature for 28 days or fewer. Just make sure the temperature doesn't get higher than 86 or lower than 46 degrees Fahrenheit.   Protect the pens from light.  Never use any pens that have .    Check your pen before use:  The liquid in the pen window should be clear and colorless. Bubbles are okay to see.   Do not use the pen if you can see the window contains any  specks or it is cloudy or has changed color.  Make sure you have the medication and dose your health care provider prescribed.    Getting ready to inject:   1. Wash and dry your hands well.  2. Make sure the counter you use to place your supplies on is clean.  3. Make sure your injection time will not be interrupted by children or pets.  4. Have alcohol wipes or alcohol and cotton balls available to clean the injection site.   5. Choose your injection site. It can be on your stomach, back of upper arms, or upper legs. Remember to change your injection site each time you inject. Try to be at least 1 inch away from the previous one. Stay at least 1 inch away from your belly button.     Inject your dose:  1. Pull off the grey cap off the pen. Throw it in the trash. Do not put the cap back on the pen.   2. Clean the injection site with alcohol.   3. Push the grey part of the pen firmly against your skin. This will start the injection.  4. When the pen is injecting, you will hear 2 clicks. The first click tells you the injection has started. The second one tells you the injection is continuing.   5. The injection is done when the yellow bar in the glass window at the bottom of the pen has stopped moving.   6. This injection is given 1 day a week. The pens come in  5 doses ranging from 0.25 mg to 2.4 mg. Each dose comes in a different color pen.  7. If you miss a dose, take is as soon as you remember. Do not take a missed dose, if it is within 2 days of the next dose.    8. Your injection may be best tolerated if given at night.     Disposing of your pens:  Dispose of your pens in a puncture-resistant container (hard plastic bottle) or Sharps container.  Check with the county you live in on how to dispose of the container.  Do not recycle the container with used pens.     Of note, you may not be able to  your medication right away. It may require a prior authorization from your insurance company. This may take a  "week or more.     Naltrexone    Naltrexone is a medication that is used most often to help people who are troubled by dependence on prescription pain killers or alcohol. It has also been found to help with weight loss. Although it's not currently FDA approved for weight loss, it has been used safely for a number of years to help people who are carrying extra weight.     Just how Naltrexone helps with weight loss has not been exactly determined.  It seems to work by quieting down brain signals related to strong food cravings. Many of our patients use the word \"addiction\" to describe their feelings and constant thoughts about food. It makes sense then to treat the feeling of dependence on food, outside of real hunger, with a medication designed to help with other sorts of dependence.     Our patients on Naltrexone find that they:    >feel less interest in food   >think less about food and eating and have more time to think of other things   >find it easier to push the plate away   >have an easier time eating less    For some of our patients, these feelings are very immediate. Other patients, don't feel much of a change but find they've lost weight. Like all weight loss medications, Naltrexone works best when you help it work. This means:  1. Having less tempting high calorie (fattening) food around the house or office. (For people with strong cravings this is very important.)   2. Staying away from situations or people that may trigger your cravings .   3. Eating out only one time or less each week.  4. Eating your meals at a table with the TV or computer off.    Side-effects. Naltrexone is generally well tolerated. The main side-effect we see is  nausea or a woozy feeling. A small number of people feel quite ill. Most people have a mild reaction and some people have no reaction at all.  The good news is that this feeling does go away.     In order to avoid nausea, please start the medication with half a pill for the " first few days. Go on to a full pill if you are feeling well.      If you  are nauseated on 1/2 a pill it is okay to cut back to 1/4 pill ( a very small amount). Take this for a couple of days and work your way back up to a 1/2 pill and then a whole pill. Taking the medication at night or with food  to start also may help prevent the feeling of nausea.       WARNING: This medication blocks the action of opioid type pain medications.    If you routinely take narcotic pain medication like Codeine, Oxycontin,Percocet,Morphine,Dilaudid or Methodone, do not take this until you have talked with weight management staff.   2.  If you are planning surgery you should stop Naltrexone 4 days prior to the surgery.   3.  If you have an injury that requires pain medication, make sure the health care staff knows you take Naltrexone.       For any questions/concerns contact [facility] [phone]

## 2024-03-19 NOTE — Clinical Note
I just met Sarai again - think we can appeal for Wegovy??  She's been making some great changes! (No weight results though)  Vivian Alcocer, PA-C

## 2024-03-19 NOTE — ASSESSMENT & PLAN NOTE
Healthy habits to assist with further weight loss were discussed. Sarai will start Naltrexone from psychiatry and plan to look at starting Wegovy once approved by her [employer] therapist and insurance.

## 2024-03-20 NOTE — TELEPHONE ENCOUNTER
Retail Pharmacy Prior Authorization Team   Phone: [phone]    Medication Appeal Initiation    Medication: WEGOVY 0.25 MG/0.5ML SC SOAJ  Appeal Start Date:  3/20/2024  Insurance Company: [employer] Phone: [phone]  Insurance Fax: [phone]  Comments:       FAXED LETTER OF MEDICAL NECESSITY AND CHART NOTES TO INSURANCE -

## 2024-03-21 NOTE — TELEPHONE ENCOUNTER
Received a fax from insurance stating patient must fill out an appointment of authorized representative. If patient would like to proceed with an appeal please have them fill out form (can obtain from [employer] website) and attach to [facility]. Otherwise I can fax the form to clinic for patient to come fill out.

## 2024-04-05 NOTE — TELEPHONE ENCOUNTER
MEDICATION APPEAL APPROVED    Medication: WEGOVY 0.25 MG/0.5ML SC SOAJ  Authorization Effective Date: 3/5/2024  Authorization Expiration Date: 4/5/2025  Appeal Insurance Company: [employer]  Filling Pharmacy: [place], [place] - [address]  Patient Notified: YES - faxed approval letter to pharmacy and notified patient via [employer] message.  Comments:

## 2024-04-05 NOTE — TELEPHONE ENCOUNTER
Medication Appeal Initiation    Medication: WEGOVY 0.25 MG/0.5ML SC SOAJ  Appeal Start Date:  4/5/2024  Insurance Company: [employer] Phone: [phone]  Insurance Fax: [phone]  Comments:       FAXED LETTER OF MEDICAL NECESSITY AND CHART NOTES TO INSURANCE WITH THE CONSENT FORM -

## 2024-04-28 ENCOUNTER — HEALTH MAINTENANCE LETTER (OUTPATIENT)
Age: 24
End: 2024-04-28

## 2024-04-29 ASSESSMENT — SLEEP AND FATIGUE QUESTIONNAIRES
HOW LIKELY ARE YOU TO NOD OFF OR FALL ASLEEP WHILE LYING DOWN TO REST IN THE AFTERNOON WHEN CIRCUMSTANCES PERMIT: SLIGHT CHANCE OF DOZING
HOW LIKELY ARE YOU TO NOD OFF OR FALL ASLEEP WHEN YOU ARE A PASSENGER IN A CAR FOR AN HOUR WITHOUT A BREAK: SLIGHT CHANCE OF DOZING
HOW LIKELY ARE YOU TO NOD OFF OR FALL ASLEEP WHILE SITTING AND READING: WOULD NEVER DOZE
HOW LIKELY ARE YOU TO NOD OFF OR FALL ASLEEP WHILE SITTING AND TALKING TO SOMEONE: WOULD NEVER DOZE
HOW LIKELY ARE YOU TO NOD OFF OR FALL ASLEEP IN A CAR, WHILE STOPPED FOR A FEW MINUTES IN TRAFFIC: WOULD NEVER DOZE
HOW LIKELY ARE YOU TO NOD OFF OR FALL ASLEEP WHILE WATCHING TV: WOULD NEVER DOZE
HOW LIKELY ARE YOU TO NOD OFF OR FALL ASLEEP WHILE SITTING INACTIVE IN A PUBLIC PLACE: WOULD NEVER DOZE
HOW LIKELY ARE YOU TO NOD OFF OR FALL ASLEEP WHILE SITTING QUIETLY AFTER LUNCH WITHOUT ALCOHOL: WOULD NEVER DOZE

## 2024-05-04 PROBLEM — F33.1 MODERATE EPISODE OF RECURRENT MAJOR DEPRESSIVE DISORDER (H): Status: ACTIVE | Noted: 2023-01-30

## 2024-05-04 PROBLEM — F41.1 GENERALIZED ANXIETY DISORDER: Status: ACTIVE | Noted: 2023-01-30

## 2024-05-04 PROBLEM — F51.5 NIGHTMARES ASSOCIATED WITH CHRONIC POST-TRAUMATIC STRESS DISORDER: Status: ACTIVE | Noted: 2024-02-21

## 2024-05-04 PROBLEM — D72.829 LEUKOCYTOSIS, UNSPECIFIED TYPE: Status: RESOLVED | Noted: 2021-03-14 | Resolved: 2024-05-04

## 2024-05-04 PROBLEM — F41.1 GENERALIZED ANXIETY DISORDER: Chronic | Status: ACTIVE | Noted: 2023-01-30

## 2024-05-04 PROBLEM — F12.90 CANNABIS USE DISORDER: Status: ACTIVE | Noted: 2024-05-04

## 2024-05-04 PROBLEM — F43.12 CHRONIC POST-TRAUMATIC STRESS DISORDER: Status: ACTIVE | Noted: 2023-06-26

## 2024-05-04 PROBLEM — F17.200 TOBACCO USE DISORDER: Status: ACTIVE | Noted: 2024-05-04

## 2024-05-04 PROBLEM — N17.9 ACUTE KIDNEY INJURY (H): Status: RESOLVED | Noted: 2021-03-14 | Resolved: 2024-05-04

## 2024-05-04 PROBLEM — F33.1 MODERATE EPISODE OF RECURRENT MAJOR DEPRESSIVE DISORDER (H): Chronic | Status: ACTIVE | Noted: 2023-01-30

## 2024-05-04 PROBLEM — N73.9 PELVIC ABSCESS IN FEMALE: Status: RESOLVED | Noted: 2021-03-14 | Resolved: 2024-05-04

## 2024-05-04 PROBLEM — F15.10 AMPHETAMINE USE DISORDER, MILD (H): Status: ACTIVE | Noted: 2024-05-04

## 2024-05-04 PROBLEM — E66.01 MORBID OBESITY WITH BMI OF 60.0-69.9, ADULT (H): Chronic | Status: ACTIVE | Noted: 2024-01-26

## 2024-05-04 PROBLEM — F43.12 NIGHTMARES ASSOCIATED WITH CHRONIC POST-TRAUMATIC STRESS DISORDER: Status: ACTIVE | Noted: 2024-02-21

## 2024-05-06 ENCOUNTER — VIRTUAL VISIT (OUTPATIENT)
Dept: SLEEP MEDICINE | Facility: CLINIC | Age: 24
End: 2024-05-06
Payer: COMMERCIAL

## 2024-05-06 VITALS — HEIGHT: 67 IN | WEIGHT: 293 LBS | BODY MASS INDEX: 45.99 KG/M2

## 2024-05-06 DIAGNOSIS — F43.12 NIGHTMARES ASSOCIATED WITH CHRONIC POST-TRAUMATIC STRESS DISORDER: ICD-10-CM

## 2024-05-06 DIAGNOSIS — R06.00 DYSPNEA AND RESPIRATORY ABNORMALITY: ICD-10-CM

## 2024-05-06 DIAGNOSIS — G47.9 DISTURBANCE IN SLEEP BEHAVIOR: ICD-10-CM

## 2024-05-06 DIAGNOSIS — F51.5 NIGHTMARES ASSOCIATED WITH CHRONIC POST-TRAUMATIC STRESS DISORDER: ICD-10-CM

## 2024-05-06 DIAGNOSIS — G47.21 DELAYED SLEEP PHASE SYNDROME: ICD-10-CM

## 2024-05-06 DIAGNOSIS — E66.01 MORBID OBESITY WITH BMI OF 60.0-69.9, ADULT (H): ICD-10-CM

## 2024-05-06 DIAGNOSIS — R06.83 SNORING: Primary | ICD-10-CM

## 2024-05-06 DIAGNOSIS — F51.04 CHRONIC INSOMNIA: ICD-10-CM

## 2024-05-06 DIAGNOSIS — R06.89 DYSPNEA AND RESPIRATORY ABNORMALITY: ICD-10-CM

## 2024-05-06 PROCEDURE — 99204 OFFICE O/P NEW MOD 45 MIN: CPT | Mod: 95 | Performed by: INTERNAL MEDICINE

## 2024-05-06 RX ORDER — FLUOXETINE 40 MG/1
CAPSULE ORAL
COMMUNITY
Start: 2024-04-30

## 2024-05-06 RX ORDER — FLUOXETINE 20 MG/1
20 TABLET, FILM COATED ORAL DAILY
COMMUNITY

## 2024-05-06 ASSESSMENT — PATIENT HEALTH QUESTIONNAIRE - PHQ9: SUM OF ALL RESPONSES TO PHQ QUESTIONS 1-9: 11

## 2024-05-06 NOTE — PROGRESS NOTES
Virtual Visit Details    Type of service:  Video Visit     Originating Location (pt. Location): Home    Distant Location (provider location):  Off-site  Platform used for Video Visit: Mandi

## 2024-05-06 NOTE — NURSING NOTE
Is the patient currently in the state of [place]? YES    Visit mode:VIDEO    If the visit is dropped, the patient can be reconnected by: VIDEO VISIT: Send to e-mail at: [email or web address]    Will anyone else be joining the visit? NO  (If patient encounters technical issues they should call [phone]    How would you like to obtain your AVS? MyChart    Are changes needed to the allergy or medication list? Pt stated no changes to allergies and Pt stated no med changes    Are refills needed on medications prescribed by this physician? NO  Has patient had flu shot for current/most recent flu season? If so, when? No  Depression Response    Patient completed the PHQ-9 assessment for depression and scored >9? Yes-11  Question 9 on the PHQ-9 was positive for suicidality? No  Does patient have current mental health provider? Yes    Is this a virtual visit? Yes   Does patient have suicidal ideation (positive question 9)? No - offer to place Mental Health Referral.  Patient declined referral/not needed-pt states she has a mental health provider.    I personally notified the following: visit provider           Reason for visit: Consult    Annie ABDI

## 2024-05-06 NOTE — PROGRESS NOTES
Outpatient Sleep Medicine Consultation:      Name: Yoly Tipton MRN# [id number]   Age: 23 year old YOB: 2000     Date of Consultation: May 6, 2024  Consultation is requested by: Vivian Alcocer PA-C  [address]  [place] [address] Vivian Alcocer  Primary care provider: [facility]       Reason for Sleep Consult:     Yoly Tipton is sent by Vivian Alcocer for a sleep consultation regarding snoring, obesity.    Patient s Reason for visit  Yoly Tipton main reason for visit: To figure out if I have sleep apnea for future surgery and to improve sleep  Patient states problem(s) started: A few years ago  Yoly Tipton's goals for this visit: Identify sleep apnea           Assessment and Plan:       Snoring, sleep maintenance difficulties, nocturia, heartburn at night, nocturnal leg kicking, morbid obesity  Considering bariatric surgery   - Recommend starting with Home Sleep Test due to delayed sleep phase. STOPBANG  2  - If HSAT normal would recommend attended Polysomnogram. If mild obstructive sleep apnea would want CPAP. If moderate-severe would recommend CPAP. If hypoventilation suggested will consider a titration study    Sleep onset, maintenance difficulties (LIOR 18)  Suspect delayed sleep phase syndrome, some psychophysiologic insomnia comorbid to depression and anxiety is suspected and complicated by probable sleep disordered breathing. We discussed stimulus control, sleep restriction and regular wake times.   - Read the book Say Good Night To Insomnia    - Consider referral for cognitive behavioral training      Obesity  We discussed the link between obesity, sleep apnea, and health outcomes.   - See patient instructions      Nightmare disorder   - Consider referral for cognitive behavioral training      Rhythmic movement disorder       Summary Counseling:    Sleep Testing Reviewed  Obstructive Sleep Apnea Reviewed  Complications of Untreated Sleep  Apnea Reviewed  Treatment options for obstructive sleep apnea reviewed       I spent 30 minutes with patient including counseling, and 20 minutes with chart review, and documentation     CC: Vivian Alcocer          History of Present Illness:       SLEEP-WAKE SCHEDULE:     Work/School Days: Patient goes to school/work: Yes, starts at 10, done before 7-8  Usually gets into bed at 9:00  Takes patient about 30 minutes to a few hrs to fall asleep  Has trouble falling asleep 4 nights per week due to 'laying there', uncomfortable, shake legs but not uncomfortable, more soothing to help her sleep. Denies an over-active mind , but may have some   Wakes up in the middle of the night 6-7 times.  Wakes up due to Use the bathroom;Other  She has trouble falling back asleep 4-5 times a week, uses her phone again, tosses and turns   It usually takes 15-30 minutes to get back to sleep  Patient is usually up at 10:30-11:30   Uses alarm: Yes    Weekends/Non-work Days/All Other Days:  Usually gets into bed at 11:30-12am   Takes patient about 2 hrs to fall asleep  Patient is usually up at 12:30pm- 1:30pm  Uses alarm: Yes    Sleep Need  Patient gets  10-12 hours sleep on average   Patient thinks she needs about 8-10 hours sleep    Yoly Tipton prefers to sleep in this position(s): Back;Stomach   Patient states they do the following activities in bed: Eat;Use phone, computer, or tablet    Naps  Patient takes a purposeful nap 0 times a week and naps are usually 0 in duration  She feels better after a nap: No  She dozes off unintentionally 0 days per week  Patient has had a driving accident or near-miss due to sleepiness/drowsiness: No      SLEEP DISRUPTIONS:    Breathing/Snoring  Patient snores:Yes , has a bed partner   Other people complain about her snoring: No  Patient has been told she stops breathing in her sleep:No  She has issues with the following: Morning mouth dryness;Stuffy nose when you wake up;Heartburn or reflux at  night;Getting up to urinate more than once    Movement:  Patient gets pain, discomfort, with an urge to move:  No  It happens when she is resting:  No  It happens more at night:  No  Patient has been told she kicks her legs at night:  Yes      Behaviors in Sleep:  Yoly MIRANDA JUANITA Tipton has experienced the following behaviors while sleeping:   Recurring Nightmares; related to PTSD, drug use dreams    Sleep-talking;  Teeth grinding  She has experienced sudden muscle weakness during the day: No      Is there anything else you would like your sleep provider to know:        CAFFEINE AND OTHER SUBSTANCES:    Patient consumes caffeinated beverages per day:  0  Last caffeine use is usually: Dont really drink caffiene  List of any prescribed or over the counter stimulants that patient takes: 0  List of any prescribed or over the counter sleep medication patient takes: Tylenol PM (2-3 capsules a night)/ melatonin  List of previous sleep medications that patient has tried: Hydroxizine , melatonin  Patient drinks alcohol to help them sleep: No  Patient drinks alcohol near bedtime: Yes    Family History:  Patient has a family member been diagnosed with a sleep disorder: Yes  My dad has sleep apnea and had a cpap machine         SCALES:    EPWORTH SLEEPINESS SCALE         4/29/2024     1:54 PM    [place] Sleepiness Scale ( EMILI Kohli  [id number]<br>ESS - [place]/English - Final version - 21 Nov 07 - [facility] [place].)   Sitting and reading Would never doze   Watching TV Would never doze   Sitting, inactive in a public place (e.g. a theatre or a meeting) Would never doze   As a passenger in a car for an hour without a break Slight chance of dozing   Lying down to rest in the afternoon when circumstances permit Slight chance of dozing   Sitting and talking to someone Would never doze   Sitting quietly after a lunch without alcohol Would never doze   In a car, while stopped for a few minutes in traffic Would never doze   [place]  Score (MC) 2   [place] Score (Sleep) 2         INSOMNIA SEVERITY INDEX (LIOR)          4/29/2024     1:42 PM   Insomnia Severity Index (LIOR)   Difficulty falling asleep 3   Difficulty staying asleep 3   Problems waking up too early 4   How SATISFIED/DISSATISFIED are you with your CURRENT sleep pattern? 3   How NOTICEABLE to others do you think your sleep problem is in terms of impairing the quality of your life? 1   How WORRIED/DISTRESSED are you about your current sleep problem? 2   To what extent do you consider your sleep problem to INTERFERE with your daily functioning (e.g. daytime fatigue, mood, ability to function at work/daily chores, concentration, memory, mood, etc.) CURRENTLY? 2   LIOR Total Score 18       Guidelines for Scoring/Interpretation:  Total score categories:  0-7 = No clinically significant insomnia   8-14 = Subthreshold insomnia   15-21 = Clinical insomnia (moderate severity)  22-28 = Clinical insomnia (severe)  Used via courtesy of www.[employer].va.gov with permission from Santiago Badillo PhD., [facility]        Allergies:    No Known Allergies    Medications:    Current Outpatient Medications   Medication Sig Dispense Refill    albuterol (PROAIR HFA/PROVENTIL HFA/VENTOLIN HFA) 108 (90 Base) MCG/ACT inhaler Inhale 2 puffs into the lungs every 4 hours as needed      azelastine (ASTELIN) 0.1 % nasal spray 1-2 sprays      diphenhydrAMINE-acetaminophen (TYLENOL PM)  MG tablet Take 3 tablets by mouth nightly as needed for sleep      FLUoxetine (PROZAC) 20 MG capsule       FLUoxetine (PROZAC) 40 MG capsule       fluticasone (FLONASE) 50 MCG/ACT nasal spray 2 sprays      montelukast (SINGULAIR) 10 MG tablet Take 10 mg by mouth daily      Semaglutide-Weight Management (WEGOVY) 0.25 MG/0.5ML pen Inject 0.25 mg Subcutaneous once a week For 4 weeks 2 mL 0    Semaglutide-Weight Management (WEGOVY) 0.5 MG/0.5ML pen Inject 0.5 mg Subcutaneous once a week 2 mL 1    Semaglutide-Weight Management  (WEGOVY) 1 MG/0.5ML pen Inject 1 mg Subcutaneous once a week 2 mL 1    FLUoxetine 20 MG tablet Take 20 mg by mouth daily         Problem List:  Patient Active Problem List    Diagnosis Date Noted    Tobacco use disorder 2024     Priority: Medium    Amphetamine use disorder 2024     Priority: Medium    Binge-eating disorder, moderate 2024     Priority: Medium    Chronic post-traumatic stress disorder 2023     Priority: Medium    Generalized anxiety disorder 2023     Priority: Medium    Moderate episode of recurrent major depressive disorder (H) 2023     Priority: Medium    Mild intermittent asthma without complication 2012     Priority: Medium     Triggers=exercise, cold weather, spring allergies.      Chronic insomnia 2024     Priority: Low    Delayed sleep phase syndrome 2024     Priority: Low    Nightmares associated with chronic post-traumatic stress disorder 2024     Priority: Low    Morbid obesity with BMI of 60.0-69.9, adult (H) 2024     Priority: Low        Past Medical/Surgical History:  Past Medical History:   Diagnosis Date    Acute kidney injury (H24) 2021    Anxiety     Asthma     Depression     Hidradenitis suppurativa     Pelvic abscess in female 2021     Past Surgical History:   Procedure Laterality Date    [place] teeth extraction         Social History:  Social History     Socioeconomic History    Marital status: Single     Spouse name: Not on file    Number of children: Not on file    Years of education: Not on file    Highest education level: Not on file   Occupational History    Occupation: [employer]    Tobacco Use    Smoking status: Every Day     Current packs/day: 0.00     Average packs/day: 0.5 packs/day for 9.0 years (4.5 ttl pk-yrs)     Types: Cigarettes     Start date: 2015     Last attempt to quit: 2024     Years since quittin.2    Smokeless tobacco: Never   Substance  "and Sexual Activity    Alcohol use: Yes     Comment: weekends, 3-5 drinks    Drug use: Not Currently     Types: Amphetamines, Marijuana     Comment: sober from amphetamines since 2020, occasional MJ use    Sexual activity: Yes     Partners: Male     Birth control/protection: Pull-out method   Other Topics Concern    Not on file   Social History Narrative    Not on file     Social Determinants of Health     Financial Resource Strain: Not on file   Food Insecurity: Not on file   Transportation Needs: Not on file   Physical Activity: Not on file   Stress: Not on file   Social Connections: Not on file   Interpersonal Safety: Not on file   Housing Stability: Not on file       Family History:  Family History   Problem Relation Age of Onset    Depression Mother     Anxiety Disorder Mother     Mental Illness Mother     Diabetes Other     Hypertension Other        Review of Systems:  A complete review of systems reviewed by me is negative with the exeption of what has been mentioned in the history of present illness.  In the last TWO WEEKS have you experienced any of the following symptoms?  Fevers: No  Night Sweats: No  Weight Gain: No  Pain at Night: No  Double Vision: No  Changes in Vision: No  Difficulty Breathing through Nose: No  Sore Throat in Morning: No  Dry Mouth in the Morning: Yes  Shortness of Breath Lying Flat: Yes  Shortness of Breath With Activity: No  Awakening with Shortness of Breath: No  Increased Cough: No  Heart Racing at Night: No  Swelling in Feet or Legs: No  Diarrhea at Night: No  Heartburn at Night: Yes  Urinating More than Once at Night: No  Losing Control of Urine at Night: No  Joint Pains at Night: No  Headaches in Morning: No  Weakness in Arms or Legs: No  Depressed Mood: Yes  Anxiety: Yes       Physical Examination:  Vitals: Ht 1.695 m (5' 6.75\")   Wt (!) 171.9 kg (379 lb)   BMI 59.81 kg/m    BMI= Body mass index is 59.81 kg/m .    SpO2 Readings from Last 4 Encounters:   01/26/24 96% "   03/20/21 90%   12/21/20 100%   11/27/20 98%       GENERAL APPEARANCE: alert and no distress  EYES: Eyes grossly normal to inspection  NECK: generous size  LUNGS: no shortness of breath , cough  NEURO: mentation intact, speech normal and cranial nerves 2-12 appear intact  PSYCH: affect normal/bright             Data: All pertinent previous laboratory data reviewed     Recent Labs   Lab Test 02/01/24  1517 03/18/21  0642    141   POTASSIUM 4.5 3.5   CHLORIDE 105 108   CO2 22 26   ANIONGAP 12 7   GLC 83 90   BUN 11.0 3*   CR 0.76 0.68   JOSEPH 9.5 8.0*       Recent Labs   Lab Test 02/01/24  1517   WBC 8.1   RBC 5.42*   HGB 14.1   HCT 44.3   MCV 82   MCH 26.0*   MCHC 31.8   RDW 14.2          Recent Labs   Lab Test 02/01/24  1517   PROTTOTAL 7.2   ALBUMIN 4.2   BILITOTAL 0.3   ALKPHOS 96   AST 27   ALT 26       TSH (uIU/mL)   Date Value   02/01/2024 2.03       Iron Saturation Index   Date/Time Value Ref Range Status   03/16/2021 09:26 AM 15 15 - 46 % Final     Ferritin   Date/Time Value Ref Range Status   03/16/2021 09:26  (H) 12 - 150 ng/mL Final           Cristopher Goldberg MD 5/6/2024

## 2024-05-06 NOTE — PATIENT INSTRUCTIONS
Behavioral Sleep Medicine Program    The [facility] Behavioral Sleep Medicine Program,  provides non-drug treatment for sleep problems as part of our multi-discipline sleep medicine program. Services offered include:    Cognitive-behavioral Therapies for Insomnia (CBT-I)  Management of Shift-work and Jet Lag Sleep Disorders  Management of Delayed, Advanced and Irregular Circadian Rhythm Sleep Disorders  Imagery Rehearsal Therapy (IRT) for Nightmare Disorder  Adaptation to PAP Therapy for Obstructive Sleep Apnea  Behavioral strategies to manage hypersomnia and fatigue    You have been referred for consultation with a sleep psychologist who specializes in behavioral sleep medicine and treatment of insomnia.  The [facility] offers individualized telehealth services through our [facility] and online CBT-I.    Preparing for your Consultation    We recommmend you keep a Sleep Diary for at least a week prior to your visit. Complete the sleep diary each day first thing after you get up by answering a few key questions about your sleep using our convenient mobile joe or paper sleep diary.  Your answers should be based on your recall of the past 24 hours.  Avoid watching the clock or recording data during the night.     Insomnia  Joe    The Insomnia  mobile joe  is a convenient way to keep track of your sleep prior to your sleep consultation.  Simply download the free joe on your Apple or Android phone and record your information each morning.  The joe includes training, self-assessment, and sleep schedule recommendations.  Prior to your consultation we recommend you use only the sleep diary function. You can e-mail yourself a copy of your sleep diary data by going to the Settings section and using the [place] User Data function.  During your consultation your provider will review the data with you.          [facility] Sleep Diary    You  can also track your sleep using the [facility] paper sleep diary.  You can upload your sleep diary and send it via a [employer] message, fax it to [phone], or have it with you at the time of your consultation.            CBT-I:  Frequently Asked Questions    What is CBT-I?    Cognitive Behavioral Therapy for Insomnia, also known as CBT-I, is a highly effective non-drug treatment for insomnia. The American College of Physicians recommends CBT-I as the first treatment for chronic insomnia.  Research has shown CBT-I to be safer and more effective long term than sleeping pills.    What does CBT-I involve?     CBT-I targets behaviors that lead to chronic insomnia:  Habits that weaken the bed as a cue for sleep  Habits that weaken your body's sleep drive and sleep/wake clock   Unhelpful sleep thoughts that increase sleep-related worry and arousal.    The process involves 3-6 telehealth visits that guide you to implement proven strategies to get a better night's sleep.    People often see improvement in their sleep within a few weeks. Research shows if you keep practicing the skills you learn your sleep is likely to continue to improve 6-12 months after treatment.    Does this program prescribe or manage sleep medication?    No.  Your prescribing provider is responsible to assist you in managing your sleep medications.  Some people choose to stop using sleep medication prior to or during CBT-I.  Our program can work with your prescribing provider to help reduce or eliminate use of sleep medications.     Getting Started Today!    If you haven't already done so, we recommend you consider making the following changes to your sleep habits prior to your sleep consultation:     Reduce your consumption of caffeine and alcohol.  Both can disrupt sleep and make strengthening your sleep more difficult.  Specifically:    - Avoid caffeine within 6 hours of bedtime   - No more than 3 caffeinated beverages per day (e.g. 8 oz.  "cup coffee or 12 oz. cup soda)            - No alcohol within 3 hours of bedtime    Make sure your bedroom is quiet, comfortable and dark.  Noise, light and an uncomfortable sleep space can harm your sleep.      Keep the same sleep schedule 7 days a week.unless you do shift work.      Our Online CBT-I Program    If you want to get started today, research indicates that online CBT-I can be effective for some individuals. These programs requires comfort with vidal-based or online learning.  However, digital CBT-I programs are not for everyone.  Contraindications include:    Seizure disorders,   Bipolar disorder,   Unstable medical or mental health conditions,   Frailty or risk of falling  Pregnancy    You should consult a sleep specialist before using these resources if you have:    Sleep Apnea  Restless Leg Syndrome  Sleep Walking  REM behavior disorder  Night Terrors  Excessive Daytime Sleepiness  Are engaged in shift work  Use prescription sleep medication    Click on the link below to get started today:                                  [email or web address]             Once you are registered you can share your sleep log data with [facility] where your health provider will be able to review your sleep log and progress.  Once you begin the program, go to the left side navigation bar and click on the  share sleep log  button:                                        This takes you to the next page where you enter the provider code [facility] and click Locate:                 This brings you to the verification page where you should see [facility] identified as your provider                                                                           By clicking \"Submit\" your sleep log data will be sent to our secure [facility] portal for review by you provider.     For Help Contact Customer Care At:    [email or web address]  If your sleep provider recommends online " CBT-I for you , the cost for an entire 6-week program is $40.    To get started, copy and paste the link below which will take you to the landing page to register:                              Self-help Workbooks for Insomnia    If you have found self-help books useful in the past, you may want to consider reading one of the following books prior to your consultation:    Say Ting to Insomnia: The Six-Week, Drug-Free Program Developed at [facility].  Yunior Landrum MD. Available in paperback, Misty, and audiobook.    Overcoming Insomnia: A Cognitive-Behavioral Therapy Approach, Workbook.  Jacky Khan, PhD  and Elvia Robb, PhD.  Available in paperback and Misty.    Quiet Your Mind and Get to Sleep: Solutions to Insomnia for Those with Depression, Anxiety, or Chronic Pain.  Anni Stovall, PhD and Elvia Robb, PhD.  Available in paperback and Misty               Your BMI is Body mass index is 59.81 kg/m .    What is BMI?  Body mass index (BMI) is one way to tell whether you are at a healthy weight, overweight, or obese. It measures your weight in relation to your height.  A BMI of 18.5 to 24.9 is in the healthy range. A person with a BMI of 25 to 29.9 is considered overweight, and someone with a BMI of 30 or greater is considered obese.  Another way to find out if you are at risk for health problems caused by overweight and obesity is to measure your waist. If you are a woman and your waist is more than 35 inches, or if you are a man and your waist is more than 40 inches, your risk of disease may be higher.  More than two-thirds of American adults are considered overweight or obese. Being overweight or obese increases the risk for further weight gain.  Excess weight may lead to heart disease and diabetes. Creating and following plans for healthy eating and physical activity may help you improve your health.    Methods for maintaining or losing weight.  Weight control is part of healthy  lifestyle and includes exercise, emotional health, and healthy eating habits.  Careful eating habits lifelong is the mainstay of weight control.  Though there are significant health benefits from weight loss, long-term weight loss with diet alone may be very difficult to achieve- studies show long-term success with dietary management in less than 10% of people. Attaining a healthy weight may be especially difficult to achieve in those with severe obesity. In some cases, medications, devices and surgical management might be considered.    What can you do?  If you are overweight or obese and are interested in methods for weight loss, you should discuss this with your provider. In addition, we recommend that you review healthy life styles and methods for weight loss available through the National Institutes of Health patient information sites:   http://win.niddk.nih.gov/publications/index.htm

## 2024-08-05 ENCOUNTER — TELEPHONE (OUTPATIENT)
Dept: SLEEP MEDICINE | Facility: CLINIC | Age: 24
End: 2024-08-05
Payer: COMMERCIAL

## 2024-08-05 NOTE — TELEPHONE ENCOUNTER
General Call      Reason for Call:  Patient unable to  HST. Can Alphonse patrick  and get patient education to relay? Unable to leave new job to come get during the day. Pickup is this Thursday.     What are your questions or concerns:  Patient unable to  HST. Can Alphonse patrick  and get patient education to relay? Unable to leave new job to come get during the day.     Date of last appointment with provider: 05/06/2024    Could we send this information to you in [email or web address] or would you prefer to receive a phone call?:   No preference   Okay to leave a detailed message?: Yes at Cell number on file:    Telephone Information:   Mobile [phone]

## 2024-08-05 NOTE — TELEPHONE ENCOUNTER
Spoke with patient. Advised she needs to  the device herself. Patient verbalized understanding. Transferred to scheduling to reschedule appointment   You can access the FollowMyHealth Patient Portal offered by [facility] by registering at the following website: [email or web address]. By joining [employer]’s FollowMyHealth portal, you will also be able to view your health information using other applications (apps) compatible with our system.

## 2024-08-23 NOTE — PROGRESS NOTES
"Sarai is a 24 year old who is being evaluated via a billable video visit.      The patient has been notified of following:     \"This video visit will be conducted via a call between you and your physician/provider. We have found that certain health care needs can be provided without the need for an in-person physical exam.  This service lets us provide the care you need with a video conversation.  If a prescription is necessary we can send it directly to your pharmacy.  If lab work is needed we can place an order for that and you can then stop by our lab to have the test done at a later time.    Video visits are billed at different rates depending on your insurance coverage.  Please reach out to your insurance provider with any questions.    If during the course of the call the physician/provider feels a video visit is not appropriate, you will not be charged for this service.\"    Patient has given verbal consent for Video visit? Yes    How would you like to obtain your AVS? MyChart    If the video visit is dropped, the invitation should be resent by: My Chart    Will anyone else be joining your video visit? No    I    Video-Visit Details    Type of service:  Video Visit    Originating Location (pt. Location): Home    Distant Location (provider location):   Off-Site - Provider Home Office    Platform used for Video Visit: mGaadi    Video Start Time: 1:02 PM    Video End Time:1:28 PM          Return Medical Weight Management Note         Yoly Tipton  MRN:  [id number]  :  2000  EMILY:  2024        Dear [facility],    I had the pleasure of seeing your patient Yoly Tipton. She is a 24 year old female who I am continuing to see for treatment of obesity related to:        2024    11:17 AM   --   I have the following health issues associated with obesity Sleep Apnea    Asthma       Assessment   Problem List Items Addressed This Visit       Morbid obesity with BMI of " 50.0-59.9, adult (H) - Primary    Binge-eating disorder, moderate          PLAN/DISCUSSION:  Congratulated patient on overall weight loss and lifestyle changes.     Discussed the importance of getting established with an Eating disorder clinic in order to continue with weight management medications. Patient was recently sent over a refill of the wegovy 1.7 mg that should be through the beginning of October.  Provided resources for her to contact regarding BED. Since patient does express interest in bariatric surgery after she loses another 40-50 lbs let her know she will need to go through a formal eating disorder clinic for evaluation and treatment. Did not agree with the plans of the therapist at [facility] and believes she went [facility] in the past but maybe it was not covered by insurance?  Not really sure. Will reach out to [facility].     Without establishing with an ED clinic/therapist and a letter confirming will not refill wegovy. Patient was made aware of this.        Plan:  Establish with an BED clinic/therapist - letter will need to be provided and PATRICIA   Set up a nurse only visit at the closest [facility] to get weight, blood pressure and pulse taken. Please ask that they forward the results.   Get lab drawn  3.   Have 3 meals   4.   Continue wegovy 1.7 mg. Has enough through November      FOLLOW-UP:  January with Vivian Alcocer PA-C      SUBJECTIVE/OBJECTIVE:  Patient primarily followed by Vivian Alcocer PA-C with last visit 3/19/2024. Was actively working with [facility] for Binge eating disorder. Therapist wanted patient to hold off on WLM's allowing time to work on lifestyle. Patient did not agree with this. Patient stopped seeing the therapist at [facility] as she felt that they did not have the same urgency for her to lose weight and she wanted to start the medication.  Stopped seeing them around February/March 2024.   Eventually did start wegovy. Is at the 1.7 mg dose. Appetite is suppressed.  Can still eat food.   Is working now. Gets off around 6 pm. Will eat in the afternoon and then have dinner.  Is trying to be more active and cooking from home more.     Feels like has been doing great with binging. Does not have the compulsion to eat as much as she used to. Has a history of trauma.     Anti-obesity medications:   Current: wegovy 1.7 mg on Fridays   Side effects:  Denies nausea, vomiting, abdominal pain, severe constipation, diarrhea, or heartburn    For medications:  AOM Considerations:  Phentermine:   Avoid, hx of amphetamine abuse and has anxiety  Topiramate:       GLP-1:        Denies personal/family hx Medullary Thyroid Ca (incl Fhx), MEN type II, pt denies personal hx pancreatitis/gallbladder issues/gastoparesis  Hx.  Discussed mechanism of action, common side effects, and monitoring for pancreatitis/gallbladder problems/low sugars/MTC/MEN2.   Naltrexone:      Denies using opiates/liver issues - naltrexone started by psychiatry  Wellbutrin:       Used prior - no hx of seizures, does endorse some anxiety  Metformin:         Contrave:  Qsymia: avoid d/t phentermine           8/29/2024     2:44 PM   Weight Loss Medication History Reviewed With Patient   Which weight loss medications are you currently taking on a regular basis? Wegovy   Are you having any side effects from the weight loss medication that we have prescribed you? No       Recent diet changes: Skips breakfast most mornings. As of 1 pm has not eaten today.   Yesterday around 2-3 pm had homemade macrina slaw and a cheese stick.  Dinner last night was homemade stuffed peppers  Drinks a lot of water    Recent exercise/activity changes: Job is physical at [employer]. Walking around park. Also doing more housework      CURRENT WEIGHT:   345 lbs 0 oz    Initial Weight (lbs): 384 lbs     Cumulative weight loss (lbs): 39  Weight Loss Percentage: 10.16%        8/29/2024     2:44 PM   Changes and Difficulties   I have made the following changes  "to my diet since my last visit: Cooking at home instead of going out to eat   With regards to my diet, I am still struggling with: Cooking alot of food and having left overs knowing i dont have an appetite   I have made the following changes to my activity/exercise since my last visit: I started working and since earl lost weight i have been able to go out and be active with my boyfriend   With regards to my activity/exercise, I am still struggling with: Nerve pain and numbness/tingling in leg when standing for too long         MEDICATIONS:   Current Outpatient Medications   Medication Sig Dispense Refill    albuterol (PROAIR HFA/PROVENTIL HFA/VENTOLIN HFA) 108 (90 Base) MCG/ACT inhaler Inhale 2 puffs into the lungs every 4 hours as needed      azelastine (ASTELIN) 0.1 % nasal spray 1-2 sprays      diphenhydrAMINE-acetaminophen (TYLENOL PM)  MG tablet Take 3 tablets by mouth nightly as needed for sleep      FLUoxetine (PROZAC) 20 MG capsule       FLUoxetine (PROZAC) 40 MG capsule       FLUoxetine 20 MG tablet Take 20 mg by mouth daily      fluticasone (FLONASE) 50 MCG/ACT nasal spray 2 sprays      Semaglutide-Weight Management (WEGOVY) 1 MG/0.5ML pen Inject 1 mg Subcutaneous once a week 2 mL 1    montelukast (SINGULAIR) 10 MG tablet Take 10 mg by mouth daily      Semaglutide-Weight Management (WEGOVY) 0.25 MG/0.5ML pen Inject 0.25 mg Subcutaneous once a week For 4 weeks (Patient not taking: Reported on 8/30/2024) 2 mL 0    Semaglutide-Weight Management (WEGOVY) 0.5 MG/0.5ML pen Inject 0.5 mg Subcutaneous once a week (Patient not taking: Reported on 8/30/2024) 2 mL 1    Semaglutide-Weight Management (WEGOVY) 1.7 MG/0.75ML pen Inject 1.7 mg Subcutaneous once a week (Patient not taking: Reported on 8/30/2024) 3 mL 3         ROS:  General  Fatigue: No  Sleep Quality: OK      PHYSICAL EXAM:  Objective    Ht 5' 6\" (1.676 m)   Wt (!) 345 lb (156.5 kg)   BMI 55.68 kg/m    GENERAL: Healthy, alert and no " distress  EYES: Eyes grossly normal to inspection.  No discharge or erythema, or obvious scleral/conjunctival abnormalities.  RESP: No audible wheeze, cough, or visible cyanosis.  No visible retractions or increased work of breathing.    SKIN: Visible skin clear. No significant rash, abnormal pigmentation or lesions.  NEURO: Cranial nerves grossly intact.  Mentation and speech appropriate for age.  PSYCH: Mentation appears normal, affect normal/bright, judgement and insight intact, normal speech and appearance well-groomed.        Sincerely,    Deedee Tay PA-C    40 minutes spent on the date of the encounter doing chart review, review of test results, patient visit and documentation

## 2024-08-30 ENCOUNTER — VIRTUAL VISIT (OUTPATIENT)
Dept: SURGERY | Facility: CLINIC | Age: 24
End: 2024-08-30
Payer: COMMERCIAL

## 2024-08-30 VITALS — BODY MASS INDEX: 47.09 KG/M2 | HEIGHT: 66 IN | WEIGHT: 293 LBS

## 2024-08-30 DIAGNOSIS — F50.811 BINGE-EATING DISORDER, MODERATE: ICD-10-CM

## 2024-08-30 DIAGNOSIS — E66.01 MORBID OBESITY WITH BMI OF 50.0-59.9, ADULT (H): Primary | ICD-10-CM

## 2024-08-30 PROCEDURE — 99203 OFFICE O/P NEW LOW 30 MIN: CPT | Mod: 95 | Performed by: PHYSICIAN ASSISTANT

## 2024-08-30 NOTE — Clinical Note
Hi. I wanted to get your thoughts on this patient of yours that I saw today. Please let me know if you agree with my plan as I had a much shorter time to go over her history than you did. I let pt know I would be taking to you. Thanks

## 2024-08-30 NOTE — PATIENT INSTRUCTIONS
"Nice to talk with you today! Thank you for allowing me the privilege of caring for you.   We hope we provided you with the excellent service you deserve.     To ensure the quality of our services you may receive a patient satisfaction survey from an independent monitoring company.  The greatest compliment you can give is \"Likely to Recommend\"      Below is our plan we discussed.-  HARDEEP Mark      Plan:  Establish with an BED clinic/therapist - can fax to [phone]  Set up a nurse only visit at the closest [facility] to get weight, blood pressure and pulse taken. Please ask that they forward the results.   Get lab drawn  3.   Have 3 meals   4.   Continue wegovy. Has enough through November    Scheduled for a follow up with Vivian Alcocer PA-C in January.  If you need to reach me sooner you can do so by calling [phone].    Have a great day!         Binge eating disorder treatment - [facility]     [facility]   [email or web address]     [facility]   [email or web address]     [facility]   [email or web address]           "

## 2024-09-03 ENCOUNTER — DOCUMENTATION ONLY (OUTPATIENT)
Dept: SURGERY | Facility: CLINIC | Age: 24
End: 2024-09-03
Payer: COMMERCIAL

## 2024-09-04 DIAGNOSIS — E66.01 MORBID OBESITY WITH BMI OF 50.0-59.9, ADULT (H): Primary | ICD-10-CM

## 2024-10-11 ENCOUNTER — HOSPITAL ENCOUNTER (EMERGENCY)
Facility: CLINIC | Age: 24
Discharge: HOME | End: 2024-10-12
Attending: PHYSICIAN ASSISTANT | Admitting: PHYSICIAN ASSISTANT
Payer: COMMERCIAL

## 2024-10-11 ENCOUNTER — APPOINTMENT (OUTPATIENT)
Dept: GENERAL RADIOLOGY | Facility: CLINIC | Age: 24
End: 2024-10-11
Attending: PHYSICIAN ASSISTANT
Payer: COMMERCIAL

## 2024-10-11 VITALS
WEIGHT: 293 LBS | BODY MASS INDEX: 47.09 KG/M2 | HEIGHT: 66 IN | HEART RATE: 93 BPM | RESPIRATION RATE: 18 BRPM | DIASTOLIC BLOOD PRESSURE: 63 MMHG | OXYGEN SATURATION: 99 % | TEMPERATURE: 97 F | SYSTOLIC BLOOD PRESSURE: 132 MMHG

## 2024-10-11 DIAGNOSIS — R11.2 NAUSEA AND VOMITING: ICD-10-CM

## 2024-10-11 DIAGNOSIS — K20.90 ESOPHAGITIS: ICD-10-CM

## 2024-10-11 DIAGNOSIS — K92.0 HEMATEMESIS, UNSPECIFIED WHETHER NAUSEA PRESENT: ICD-10-CM

## 2024-10-11 LAB
ALBUMIN SERPL BCG-MCNC: 4.5 G/DL (ref 3.5–5.2)
ALP SERPL-CCNC: 90 U/L (ref 40–150)
ALT SERPL W P-5'-P-CCNC: 51 U/L (ref 0–50)
ANION GAP SERPL CALCULATED.3IONS-SCNC: 20 MMOL/L (ref 7–15)
AST SERPL W P-5'-P-CCNC: 47 U/L (ref 0–45)
BASOPHILS # BLD AUTO: 0 10E3/UL (ref 0–0.2)
BASOPHILS NFR BLD AUTO: 0 %
BILIRUB SERPL-MCNC: 0.4 MG/DL
BUN SERPL-MCNC: 10.4 MG/DL (ref 6–20)
CALCIUM SERPL-MCNC: 9.3 MG/DL (ref 8.8–10.4)
CHLORIDE SERPL-SCNC: 103 MMOL/L (ref 98–107)
CREAT SERPL-MCNC: 0.98 MG/DL (ref 0.51–0.95)
EGFRCR SERPLBLD CKD-EPI 2021: 82 ML/MIN/1.73M2
EOSINOPHIL # BLD AUTO: 0 10E3/UL (ref 0–0.7)
EOSINOPHIL NFR BLD AUTO: 0 %
ERYTHROCYTE [DISTWIDTH] IN BLOOD BY AUTOMATED COUNT: 13.6 % (ref 10–15)
GLUCOSE SERPL-MCNC: 71 MG/DL (ref 70–99)
HCG SERPL QL: NEGATIVE
HCO3 SERPL-SCNC: 18 MMOL/L (ref 22–29)
HCT VFR BLD AUTO: 45.8 % (ref 35–47)
HGB BLD-MCNC: 14.5 G/DL (ref 11.7–15.7)
HOLD SPECIMEN: NORMAL
HOLD SPECIMEN: NORMAL
IMM GRANULOCYTES # BLD: 0 10E3/UL
IMM GRANULOCYTES NFR BLD: 0 %
LIPASE SERPL-CCNC: 19 U/L (ref 13–60)
LYMPHOCYTES # BLD AUTO: 1.7 10E3/UL (ref 0.8–5.3)
LYMPHOCYTES NFR BLD AUTO: 13 %
MCH RBC QN AUTO: 26.5 PG (ref 26.5–33)
MCHC RBC AUTO-ENTMCNC: 31.7 G/DL (ref 31.5–36.5)
MCV RBC AUTO: 84 FL (ref 78–100)
MONOCYTES # BLD AUTO: 0.6 10E3/UL (ref 0–1.3)
MONOCYTES NFR BLD AUTO: 5 %
NEUTROPHILS # BLD AUTO: 10.9 10E3/UL (ref 1.6–8.3)
NEUTROPHILS NFR BLD AUTO: 81 %
NRBC # BLD AUTO: 0 10E3/UL
NRBC BLD AUTO-RTO: 0 /100
PLATELET # BLD AUTO: 325 10E3/UL (ref 150–450)
POTASSIUM SERPL-SCNC: 4.7 MMOL/L (ref 3.4–5.3)
PROT SERPL-MCNC: 8 G/DL (ref 6.4–8.3)
RBC # BLD AUTO: 5.48 10E6/UL (ref 3.8–5.2)
SODIUM SERPL-SCNC: 141 MMOL/L (ref 135–145)
WBC # BLD AUTO: 13.4 10E3/UL (ref 4–11)

## 2024-10-11 PROCEDURE — 36415 COLL VENOUS BLD VENIPUNCTURE: CPT | Performed by: EMERGENCY MEDICINE

## 2024-10-11 PROCEDURE — 99284 EMERGENCY DEPT VISIT MOD MDM: CPT | Mod: 25

## 2024-10-11 PROCEDURE — 85025 COMPLETE CBC W/AUTO DIFF WBC: CPT | Performed by: PHYSICIAN ASSISTANT

## 2024-10-11 PROCEDURE — 96361 HYDRATE IV INFUSION ADD-ON: CPT

## 2024-10-11 PROCEDURE — 250N000011 HC RX IP 250 OP 636: Performed by: PHYSICIAN ASSISTANT

## 2024-10-11 PROCEDURE — 250N000013 HC RX MED GY IP 250 OP 250 PS 637: Performed by: PHYSICIAN ASSISTANT

## 2024-10-11 PROCEDURE — 84703 CHORIONIC GONADOTROPIN ASSAY: CPT | Performed by: PHYSICIAN ASSISTANT

## 2024-10-11 PROCEDURE — 96374 THER/PROPH/DIAG INJ IV PUSH: CPT

## 2024-10-11 PROCEDURE — 71046 X-RAY EXAM CHEST 2 VIEWS: CPT

## 2024-10-11 PROCEDURE — 96375 TX/PRO/DX INJ NEW DRUG ADDON: CPT

## 2024-10-11 PROCEDURE — 80053 COMPREHEN METABOLIC PANEL: CPT | Performed by: PHYSICIAN ASSISTANT

## 2024-10-11 PROCEDURE — 258N000003 HC RX IP 258 OP 636: Performed by: PHYSICIAN ASSISTANT

## 2024-10-11 PROCEDURE — 83690 ASSAY OF LIPASE: CPT | Performed by: PHYSICIAN ASSISTANT

## 2024-10-11 RX ORDER — ONDANSETRON 2 MG/ML
4 INJECTION INTRAMUSCULAR; INTRAVENOUS EVERY 30 MIN PRN
Status: DISCONTINUED | OUTPATIENT
Start: 2024-10-11 | End: 2024-10-12 | Stop reason: HOSPADM

## 2024-10-11 RX ORDER — SUCRALFATE ORAL 1 G/10ML
1 SUSPENSION ORAL 4 TIMES DAILY PRN
Qty: 200 ML | Refills: 0 | Status: SHIPPED | OUTPATIENT
Start: 2024-10-11

## 2024-10-11 RX ORDER — SUCRALFATE ORAL 1 G/10ML
1 SUSPENSION ORAL ONCE
Status: COMPLETED | OUTPATIENT
Start: 2024-10-11 | End: 2024-10-11

## 2024-10-11 RX ORDER — ONDANSETRON 4 MG/1
4 TABLET, ORALLY DISINTEGRATING ORAL EVERY 8 HOURS PRN
Qty: 10 TABLET | Refills: 0 | Status: SHIPPED | OUTPATIENT
Start: 2024-10-11

## 2024-10-11 RX ADMIN — SUCRALFATE ORAL 1 G: 1 SUSPENSION ORAL at 23:13

## 2024-10-11 RX ADMIN — FAMOTIDINE 20 MG: 10 INJECTION, SOLUTION INTRAVENOUS at 22:35

## 2024-10-11 RX ADMIN — SODIUM CHLORIDE 1000 ML: 9 INJECTION, SOLUTION INTRAVENOUS at 21:24

## 2024-10-11 RX ADMIN — ONDANSETRON 4 MG: 2 INJECTION INTRAMUSCULAR; INTRAVENOUS at 21:24

## 2024-10-11 ASSESSMENT — COLUMBIA-SUICIDE SEVERITY RATING SCALE - C-SSRS
6. HAVE YOU EVER DONE ANYTHING, STARTED TO DO ANYTHING, OR PREPARED TO DO ANYTHING TO END YOUR LIFE?: NO
2. HAVE YOU ACTUALLY HAD ANY THOUGHTS OF KILLING YOURSELF IN THE PAST MONTH?: NO
1. IN THE PAST MONTH, HAVE YOU WISHED YOU WERE DEAD OR WISHED YOU COULD GO TO SLEEP AND NOT WAKE UP?: NO

## 2024-10-11 ASSESSMENT — ACTIVITIES OF DAILY LIVING (ADL)
ADLS_ACUITY_SCORE: 35

## 2024-10-12 NOTE — DISCHARGE INSTRUCTIONS
Use Zofran for nausea. Stay hydrated. Eat small amounts of bland food.  Consider taking Pepcid 10mg daily for the next few days to help to decrease acid production in the stomach and to help with pain.  Use Carafate to help with discomfort of esophagus/stomach.

## 2024-10-12 NOTE — ED TRIAGE NOTES
Pt reports she started vomiting at 0900. Pt reports she puked 3 times with blood then after that it was bile. Pt seen at , gave pt some zofran and then sent to ER.  Pt reports she has been vomiting since the zofran. Pt reports no blood in the vomit since this AM

## 2024-10-12 NOTE — ED PROVIDER NOTES
Emergency Department Note      History of Present Illness     Chief Complaint   Nausea & Vomiting      HPI   Yoly Tipton is a 24 year old female who presents to the ED who presents ED for evaluation of nausea and vomiting.  Patient notes that she went to a concert and was imbibing last night.  She woke up this a.m. around 0900 and started to vomit. She states that she has had 10+ episodes of emesis since that time. She states that she did have small amounts of blood in the initial episodes of emesis. She endorses very forceful vomiting. She notes ongoing nausea at this time. No fevers or chills. No preceding sore throat, but she notes mild pharyngitis at this time. No chest pain. No abdominal pain. She denies pregnancy, but notes that she missed her period last month. She has had more episodes of emesis without any blood present.    Independent Historian   None    Review of External Notes   None    Past Medical History     Medical History and Problem List   Past Medical History:   Diagnosis Date    Acute kidney injury (H) 03/14/2021    Anxiety     Asthma     Depression     Hidradenitis suppurativa     Pelvic abscess in female 03/14/2021       Medications   ondansetron (ZOFRAN ODT) 4 MG ODT tab  sucralfate (CARAFATE) 1 GM/10ML suspension  albuterol (PROAIR HFA/PROVENTIL HFA/VENTOLIN HFA) 108 (90 Base) MCG/ACT inhaler  azelastine (ASTELIN) 0.1 % nasal spray  diphenhydrAMINE-acetaminophen (TYLENOL PM)  MG tablet  FLUoxetine (PROZAC) 20 MG capsule  FLUoxetine (PROZAC) 40 MG capsule  FLUoxetine 20 MG tablet  fluticasone (FLONASE) 50 MCG/ACT nasal spray  montelukast (SINGULAIR) 10 MG tablet  Semaglutide-Weight Management (WEGOVY) 0.25 MG/0.5ML pen  Semaglutide-Weight Management (WEGOVY) 0.5 MG/0.5ML pen  Semaglutide-Weight Management (WEGOVY) 1 MG/0.5ML pen  Semaglutide-Weight Management (WEGOVY) 1.7 MG/0.75ML pen        Surgical History   Past Surgical History:   Procedure Laterality Date    [place] teeth  "extraction  2015       Physical Exam     Patient Vitals for the past 24 hrs:   BP Temp Temp src Pulse Resp SpO2 Height Weight   10/11/24 1915 132/63 97  F (36.1  C) Temporal 93 18 99 % 1.676 m (5' 6\") 149.6 kg (329 lb 12.9 oz)     Physical Exam  Constitutional: Pleasant. Cooperative.   Eyes: Pupils equally round and reactive  HENT: Head is normal in appearance. Mild posterior oropharyngeal erythema. No exudates. MMM.  Cardiovascular: Regular rate and rhythm and without murmurs.  Respiratory: Normal respiratory effort, lungs are clear bilaterally.  GI: Mild epigastric TTP, otherwise non-tender, soft, non-distended. No guarding, rebound, or rigidity.  Musculoskeletal: No asymmetry of the lower extremities.  Skin: Normal, without rash.  Neurologic: Cranial nerves grossly intact, normal cognition, no focal deficits. Alert and oriented x 3.   Psychiatric: Normal affect.  Nursing notes and vital signs reviewed.      Diagnostics     Lab Results   Labs Ordered and Resulted from Time of ED Arrival to Time of ED Departure   COMPREHENSIVE METABOLIC PANEL - Abnormal       Result Value    Sodium 141      Potassium 4.7      Carbon Dioxide (CO2) 18 (*)     Anion Gap 20 (*)     Urea Nitrogen 10.4      Creatinine 0.98 (*)     GFR Estimate 82      Calcium 9.3      Chloride 103      Glucose 71      Alkaline Phosphatase 90      AST 47 (*)     ALT 51 (*)     Protein Total 8.0      Albumin 4.5      Bilirubin Total 0.4     CBC WITH PLATELETS AND DIFFERENTIAL - Abnormal    WBC Count 13.4 (*)     RBC Count 5.48 (*)     Hemoglobin 14.5      Hematocrit 45.8      MCV 84      MCH 26.5      MCHC 31.7      RDW 13.6      Platelet Count 325      % Neutrophils 81      % Lymphocytes 13      % Monocytes 5      % Eosinophils 0      % Basophils 0      % Immature Granulocytes 0      NRBCs per 100 WBC 0      Absolute Neutrophils 10.9 (*)     Absolute Lymphocytes 1.7      Absolute Monocytes 0.6      Absolute Eosinophils 0.0      Absolute Basophils 0.0      " Absolute Immature Granulocytes 0.0      Absolute NRBCs 0.0     LIPASE - Normal    Lipase 19     HCG QUALITATIVE PREGNANCY - Normal    hCG Serum Qualitative Negative         Imaging   Chest XR,  PA & LAT   Final Result   IMPRESSION: Negative chest.        Independent Interpretation   I personally evaluated the CXR, no acute abnormality noted.    ED Course      Medications Administered   Medications   ondansetron (ZOFRAN) injection 4 mg (4 mg Intravenous $Given 10/11/24 2124)   sodium chloride 0.9% BOLUS 1,000 mL (0 mLs Intravenous Stopped 10/11/24 2230)   famotidine (PEPCID) injection 20 mg (20 mg Intravenous $Given 10/11/24 2235)   sucralfate (CARAFATE) suspension 1 g (1 g Oral $Given 10/11/24 2313)       Procedures   Procedures     Discussion of Management   None    ED Course        Additional Documentation  None    Medical Decision Making / Diagnosis     CMS Diagnoses: None    MIPS       None    MDM   Yoly Tipton is a 24 year old female who presents to the ED for evaluation of nausea and vomiting. See HPI as above for additional details. Vitals and physical exam as above. DDx was broad and included gastritis, pancreatitis, hepatitis, atypical ACS, viral GI illness, amongst others. Patient consumed alcohol last night and began vomiting this AM and it has since persisted. No known ill contacts. No suggestion for pancreatitis or viral hepatitis. Low suspicion for atypical ACS in low risk patient. Given no abdominal pain and only mild TTP to epigastrium, with shared decision making elected to defer on abdominal imaging. No RUQ pain to suggest for biliary pathology. After improvement of nausea, patient able to tolerate PO here, however did thereafter develop some burning sensation to the sternal/substernal region. CXR then obtained without evidence for boerhaave. Patient provided carafate with resolution of symptoms. Suspect esophagitis due to recurrent vomiting. Do suspect scant hematemesis was secondary to  forceful vomiting that patient's describes, but again no suggestion for esophageal perforation or persistent bleed at this time, as patient had multiple episodes of subsequent emesis without blood present. Do feel patient is safe for discharge to home with Rxs for medications as below. Follow up with PCP with persistent symptoms. Discussed reasons to return. All questions answered. Patient discharged to home in stable condition.    Disposition   The patient was discharged.     Diagnosis     ICD-10-CM    1. Nausea and vomiting  R11.2       2. Hematemesis, unspecified whether nausea present  K92.0       3. Esophagitis  K20.90     presumed           Discharge Medications   New Prescriptions    ONDANSETRON (ZOFRAN ODT) 4 MG ODT TAB    Take 1 tablet (4 mg) by mouth every 8 hours as needed for nausea.    SUCRALFATE (CARAFATE) 1 GM/10ML SUSPENSION    Take 10 mLs (1 g) by mouth 4 times daily as needed for nausea (stomach/esophageal discomfort).       This record was created at least in part using electronic voice recognition software, so please excuse any typographical errors.         Garfield Leija PA-C  10/11/24 [id number]

## 2024-10-28 ENCOUNTER — MYC REFILL (OUTPATIENT)
Dept: SURGERY | Facility: CLINIC | Age: 24
End: 2024-10-28
Payer: COMMERCIAL

## 2024-10-28 DIAGNOSIS — E66.01 MORBID OBESITY WITH BMI OF 50.0-59.9, ADULT (H): ICD-10-CM

## 2024-10-29 DIAGNOSIS — E66.01 MORBID OBESITY WITH BMI OF 50.0-59.9, ADULT (H): ICD-10-CM

## 2024-10-29 NOTE — TELEPHONE ENCOUNTER
Will refuse due to has one refill verified by Serjio at  pharm.  Pt updated.  Will refuse per clinic protocol.  Dalila Mahajan MS, RD, RN

## 2024-10-30 RX ORDER — SEMAGLUTIDE 1.7 MG/.75ML
1.7 INJECTION, SOLUTION SUBCUTANEOUS
Qty: 3 ML | Refills: 3 | OUTPATIENT
Start: 2024-10-30

## 2024-10-30 NOTE — TELEPHONE ENCOUNTER
Per provider msg to pt yesterday 10/29/24:   Thank you for the update. I hope you are well. Unfortunately I will not be able to send over any additional refills of the wegovy given our previous conversation and the need to reestablish with the eating disorder clinic. I know this is frustrating and I am glad you are on the wait list. I hope you will be able to get in sooner. I did speak with Vivian regarding your circumstances after our visit in August and she was in agreement with the plan to have you hold off on weight loss medications for now.   Dalila Mahajan, MS, RD, RN

## 2025-05-17 ENCOUNTER — HEALTH MAINTENANCE LETTER (OUTPATIENT)
Age: 25
End: 2025-05-17